# Patient Record
Sex: MALE | Race: WHITE
[De-identification: names, ages, dates, MRNs, and addresses within clinical notes are randomized per-mention and may not be internally consistent; named-entity substitution may affect disease eponyms.]

---

## 2017-01-20 ENCOUNTER — TELEPHONE (OUTPATIENT)
Dept: CASE MANAGEMENT | Age: 62
End: 2017-01-20

## 2021-07-23 ENCOUNTER — TELEPHONE (OUTPATIENT)
Dept: FAMILY MEDICINE CLINIC | Age: 66
End: 2021-07-23

## 2021-07-23 NOTE — TELEPHONE ENCOUNTER
----- Message from Gadiel Quiet sent at 2021  4:03 PM EDT -----  Subject: Appointment Request    Reason for Call: New Patient Request Appointment    QUESTIONS  Type of Appointment? New Patient/New to Provider  Reason for appointment request? Requested Provider unavailable - [place]  Additional Information for Provider? Pt's wife Oswaldo Gomes) saw Dr. Inocencio Hutchinson last week and she said he gave verbal approval for her  to   become a new pt. Savanna Vivas is retired and can come in any day of the week at   any time. Screened green. ---------------------------------------------------------------------------  --------------  Heber MCQUEEN  What is the best way for the office to contact you? OK to leave message on   voicemail  Preferred Call Back Phone Number? [phone]  ---------------------------------------------------------------------------  --------------  SCRIPT ANSWERS  Relationship to Patient? Other  Representative Name? Wife- Melissa Duckworth  Additional information verified (besides Name and Date of Birth)? Address  Appointment reason? Establish Care/Find a provider  Is this the first appointment to establish care for a ? No  Have you been diagnosed with, awaiting test results for, or told that you   are suspected of having COVID-19 (Coronavirus)? (If patient has tested   negative or was tested as a requirement for work, school, or travel and   not based on symptoms, answer no)? No  Do you currently have flu-like symptoms including fever or chills, cough,   shortness of breath, difficulty breathing, or new loss of taste or smell? No  Have you had close contact with someone with COVID-19 in the last 14 days? No  (Service Expert  click yes below to proceed with [employer] As Usual   Scheduling)?  Yes

## 2021-07-23 NOTE — TELEPHONE ENCOUNTER
Please advise    Additional Information for Provider? Pt's wife Raisa Batres) saw Sandra Randall last week and she said he gave verbal approval for her  to become a new pt. Roxane Flores is retired and can come in any day of the week at any time. Screened green.

## 2021-08-17 ENCOUNTER — OFFICE VISIT (OUTPATIENT)
Dept: FAMILY MEDICINE CLINIC | Age: 66
End: 2021-08-17
Payer: COMMERCIAL

## 2021-08-17 VITALS
DIASTOLIC BLOOD PRESSURE: 82 MMHG | OXYGEN SATURATION: 99 % | RESPIRATION RATE: 14 BRPM | TEMPERATURE: 99.3 F | HEIGHT: 68 IN | HEART RATE: 75 BPM | SYSTOLIC BLOOD PRESSURE: 134 MMHG | WEIGHT: 196.8 LBS | BODY MASS INDEX: 29.83 KG/M2

## 2021-08-17 DIAGNOSIS — J84.9 ILD (INTERSTITIAL LUNG DISEASE) (HCC): ICD-10-CM

## 2021-08-17 DIAGNOSIS — M33.90 DERMATOMYOSITIS (HCC): ICD-10-CM

## 2021-08-17 DIAGNOSIS — R03.0 ELEVATED BLOOD PRESSURE READING: ICD-10-CM

## 2021-08-17 DIAGNOSIS — Z00.00 ROUTINE GENERAL MEDICAL EXAMINATION AT A HEALTH CARE FACILITY: Primary | ICD-10-CM

## 2021-08-17 DIAGNOSIS — Z12.5 SCREENING FOR PROSTATE CANCER: ICD-10-CM

## 2021-08-17 DIAGNOSIS — G47.33 OSA (OBSTRUCTIVE SLEEP APNEA): ICD-10-CM

## 2021-08-17 DIAGNOSIS — R73.9 HYPERGLYCEMIA: ICD-10-CM

## 2021-08-17 PROCEDURE — 99386 PREV VISIT NEW AGE 40-64: CPT | Performed by: FAMILY MEDICINE

## 2021-08-17 RX ORDER — M-VIT,TX,IRON,MINS/CALC/FOLIC 27MG-0.4MG
1 TABLET ORAL DAILY
COMMUNITY

## 2021-08-17 SDOH — ECONOMIC STABILITY: FOOD INSECURITY: WITHIN THE PAST 12 MONTHS, THE FOOD YOU BOUGHT JUST DIDN'T LAST AND YOU DIDN'T HAVE MONEY TO GET MORE.: NEVER TRUE

## 2021-08-17 SDOH — ECONOMIC STABILITY: FOOD INSECURITY: WITHIN THE PAST 12 MONTHS, YOU WORRIED THAT YOUR FOOD WOULD RUN OUT BEFORE YOU GOT MONEY TO BUY MORE.: NEVER TRUE

## 2021-08-17 ASSESSMENT — PATIENT HEALTH QUESTIONNAIRE - PHQ9
SUM OF ALL RESPONSES TO PHQ QUESTIONS 1-9: 0
1. LITTLE INTEREST OR PLEASURE IN DOING THINGS: 0
SUM OF ALL RESPONSES TO PHQ QUESTIONS 1-9: 0
SUM OF ALL RESPONSES TO PHQ QUESTIONS 1-9: 0
2. FEELING DOWN, DEPRESSED OR HOPELESS: 0
SUM OF ALL RESPONSES TO PHQ9 QUESTIONS 1 & 2: 0

## 2021-08-17 ASSESSMENT — SOCIAL DETERMINANTS OF HEALTH (SDOH): HOW HARD IS IT FOR YOU TO PAY FOR THE VERY BASICS LIKE FOOD, HOUSING, MEDICAL CARE, AND HEATING?: NOT HARD AT ALL

## 2021-08-17 NOTE — PROGRESS NOTES
Here to establish care and get started with practice. Pt states he is retired at this time, used to work for [employer]. Pts biggest issues is related to dermatomyositis, working with dr. Saul Solis. Pt is currently on imuran and it has done well for them. Pt has not issues with restriction in joints, and does follow with rheum. Pt has hx of CT lung tests and PFT's    Pt denies any new issues or concerns. Pt denies any issues of chest pain, shortness of breath. Pt has not been as active with lifestyle, does stay busy but is not doing much in terms of formal exercise. Pt does tend to eat out a little bit more frequently but does well with portion control issues. Except as noted in the history of present illness as above, the review of systems is negative for the following:    General ROS: negative  Psychological ROS: negative  Allergy and Immunology ROS: negative  Hematological and Lymphatic ROS: negative  Respiratory ROS: no cough, shortness of breath, or wheezing  Cardiovascular ROS: no chest pain or dyspnea on exertion  Gastrointestinal ROS: no abdominal pain, change in bowel habits, or black or bloody stools  Genito-Urinary ROS: no dysuria, trouble voiding, or hematuria  Musculoskeletal ROS: negative  Dermatological ROS: negative      Past medical, surgical, and social history reviewed and updated. Medications and allergies reviewed and updated      /82   Pulse 75   Temp 99.3 °F (37.4 °C) (Temporal)   Resp 14   Ht 5' 7.5\" (1.715 m)   Wt 196 lb 12.8 oz (89.3 kg)   SpO2 99%   BMI 30.37 kg/m²   General appearance - healthy, alert, no distress  Skin - Skin color, texture, turgor normal. No rashes or lesions. No suspicious findings  Head - Normocephalic. No masses, lesions, tenderness or abnormalities  Eyes - conjunctivae/corneas clear. Pupils equal and reactive to light and accomodation, extraocular muscles intact. Ears - External ears normal. Canals clear.  Tympanic membranes normal bilaterally. Nose/Sinuses - Nares normal. Septum midline. Mucosa normal. No drainage or sinus tenderness. Oropharynx - Lips, mucosa, and tongue normal. Teeth and gums normal.   Neck - Neck supple. No adenopathy. Thyroid symmetric, normal size, no carotid bruit bilaterally  Back - Back symmetric, no curvature. Range of motion normal. No Costovertebral angle tenderness. Lungs - Percussion normal. Good diaphragmatic excursion. Lungs clear without wheeze, rales, crackles  Heart - Regular rate and rhythm, with no rub, murmur or gallop noted. Abdomen - Abdomen soft, non-tender. Bowel sounds normal. No masses, tenderness or organomegaly  Extremities - Extremities normal. No deformities, edema, or skin discoloration  Musculoskeletal - Spine ROM normal. Muscular strength intact. Peripheral pulses - radial=4/4,, femoral=4/4, popliteal=4/4, dorsalis pedis=4/4,  Neuro - Gait normal. Reflexes normal and symmetric. Sensation grossly normal.  No focal weakness  Psych - euthymic, no suicidal thoughts or ideation, mood stable. Current Outpatient Medications   Medication Sig Dispense Refill    Ferrous Sulfate (IRON PO) Take by mouth      Multiple Vitamins-Minerals (THERAPEUTIC MULTIVITAMIN-MINERALS) tablet Take 1 tablet by mouth daily      Naproxen Sodium (ALEVE PO) Take by mouth as needed      azaTHIOprine (IMURAN) 50 MG tablet Take 50 mg by mouth daily      aspirin 81 MG chewable tablet Take 81 mg by mouth daily       No current facility-administered medications for this visit. ASSESSMENT / PLAN:    1. Routine general medical examination at a health care facility  No focal abnormalities on exam  Anticipatory guidance discussed. - Comprehensive Metabolic Panel; Future  - Lipid Panel; Future  - Hemoglobin A1C; Future  - PSA, Prostatic Specific Antigen; Future    2. Dermatomyositis ([facility] [address]  Cont f/u with rheum  Cont imuran  Reviewed bloodwork through care everywhere    3. SHILPA (obstructive sleep apnea)    4.  ILD (interstitial lung disease) ([facility] [address]  Asymptomatic  Cont imuran and f/u with rheumatology    5. Hyperglycemia  - Hemoglobin A1C; Future    6. Screening for prostate cancer  - PSA, Prostatic Specific Antigen; Future    7. Elevated blood pressure reading  Recheck normal  monitor           Follow-up appointment:   Pending bloodwork results/prn    Discussed use, benefit, and side effects of all prescribed medications. Barriers to medication compliance addressed. All patient questions answered. Pt voiced understanding. When applicable, patient's outside records were reviewed through [facility]. The patient has signed appropriate paperworks/consents.

## 2022-11-08 ENCOUNTER — OFFICE VISIT (OUTPATIENT)
Dept: FAMILY MEDICINE CLINIC | Age: 67
End: 2022-11-08
Payer: COMMERCIAL

## 2022-11-08 VITALS
BODY MASS INDEX: 29.28 KG/M2 | RESPIRATION RATE: 14 BRPM | DIASTOLIC BLOOD PRESSURE: 78 MMHG | HEIGHT: 68 IN | SYSTOLIC BLOOD PRESSURE: 130 MMHG | OXYGEN SATURATION: 98 % | WEIGHT: 193.2 LBS | TEMPERATURE: 97.3 F | HEART RATE: 65 BPM

## 2022-11-08 DIAGNOSIS — R73.9 HYPERGLYCEMIA: ICD-10-CM

## 2022-11-08 DIAGNOSIS — J84.9 ILD (INTERSTITIAL LUNG DISEASE) (HCC): ICD-10-CM

## 2022-11-08 DIAGNOSIS — Z12.5 SCREENING FOR MALIGNANT NEOPLASM OF PROSTATE: ICD-10-CM

## 2022-11-08 DIAGNOSIS — M54.50 CHRONIC RIGHT-SIDED LOW BACK PAIN WITHOUT SCIATICA: ICD-10-CM

## 2022-11-08 DIAGNOSIS — Z00.00 ROUTINE GENERAL MEDICAL EXAMINATION AT A HEALTH CARE FACILITY: Primary | ICD-10-CM

## 2022-11-08 DIAGNOSIS — G89.29 CHRONIC RIGHT-SIDED LOW BACK PAIN WITHOUT SCIATICA: ICD-10-CM

## 2022-11-08 DIAGNOSIS — Z00.00 ROUTINE GENERAL MEDICAL EXAMINATION AT A HEALTH CARE FACILITY: ICD-10-CM

## 2022-11-08 DIAGNOSIS — Z23 NEEDS FLU SHOT: ICD-10-CM

## 2022-11-08 DIAGNOSIS — Z23 NEED FOR VACCINATION FOR PNEUMOCOCCUS: ICD-10-CM

## 2022-11-08 DIAGNOSIS — M33.90 DERMATOMYOSITIS (HCC): ICD-10-CM

## 2022-11-08 LAB
A/G RATIO: 2.3 (ref 1.1–2.2)
ALBUMIN SERPL-MCNC: 4.6 G/DL (ref 3.4–5)
ALP BLD-CCNC: 116 U/L (ref 40–129)
ALT SERPL-CCNC: 15 U/L (ref 10–40)
ANION GAP SERPL CALCULATED.3IONS-SCNC: 15 MMOL/L (ref 3–16)
AST SERPL-CCNC: 21 U/L (ref 15–37)
BILIRUB SERPL-MCNC: 0.5 MG/DL (ref 0–1)
BUN BLDV-MCNC: 20 MG/DL (ref 7–20)
CALCIUM SERPL-MCNC: 9.3 MG/DL (ref 8.3–10.6)
CHLORIDE BLD-SCNC: 101 MMOL/L (ref 99–110)
CHOLESTEROL, TOTAL: 176 MG/DL (ref 0–199)
CO2: 25 MMOL/L (ref 21–32)
CREAT SERPL-MCNC: 1.1 MG/DL (ref 0.8–1.3)
GFR SERPL CREATININE-BSD FRML MDRD: >60 ML/MIN/{1.73_M2}
GLUCOSE BLD-MCNC: 105 MG/DL (ref 70–99)
HCT VFR BLD CALC: 44.2 % (ref 40.5–52.5)
HDLC SERPL-MCNC: 41 MG/DL (ref 40–60)
HEMOGLOBIN: 15.1 G/DL (ref 13.5–17.5)
LDL CHOLESTEROL CALCULATED: 113 MG/DL
MCH RBC QN AUTO: 32.9 PG (ref 26–34)
MCHC RBC AUTO-ENTMCNC: 34.2 G/DL (ref 31–36)
MCV RBC AUTO: 96.1 FL (ref 80–100)
PDW BLD-RTO: 14.2 % (ref 12.4–15.4)
PLATELET # BLD: 267 K/UL (ref 135–450)
PMV BLD AUTO: 8.1 FL (ref 5–10.5)
POTASSIUM SERPL-SCNC: 4.3 MMOL/L (ref 3.5–5.1)
PROSTATE SPECIFIC ANTIGEN: 0.19 NG/ML (ref 0–4)
RBC # BLD: 4.6 M/UL (ref 4.2–5.9)
SODIUM BLD-SCNC: 141 MMOL/L (ref 136–145)
TOTAL PROTEIN: 6.6 G/DL (ref 6.4–8.2)
TRIGL SERPL-MCNC: 110 MG/DL (ref 0–150)
VLDLC SERPL CALC-MCNC: 22 MG/DL
WBC # BLD: 11.1 K/UL (ref 4–11)

## 2022-11-08 PROCEDURE — 90694 VACC AIIV4 NO PRSRV 0.5ML IM: CPT | Performed by: FAMILY MEDICINE

## 2022-11-08 PROCEDURE — 90677 PCV20 VACCINE IM: CPT | Performed by: FAMILY MEDICINE

## 2022-11-08 PROCEDURE — 90472 IMMUNIZATION ADMIN EACH ADD: CPT | Performed by: FAMILY MEDICINE

## 2022-11-08 PROCEDURE — 99397 PER PM REEVAL EST PAT 65+ YR: CPT | Performed by: FAMILY MEDICINE

## 2022-11-08 PROCEDURE — 90471 IMMUNIZATION ADMIN: CPT | Performed by: FAMILY MEDICINE

## 2022-11-08 SDOH — ECONOMIC STABILITY: FOOD INSECURITY: WITHIN THE PAST 12 MONTHS, YOU WORRIED THAT YOUR FOOD WOULD RUN OUT BEFORE YOU GOT MONEY TO BUY MORE.: NEVER TRUE

## 2022-11-08 SDOH — ECONOMIC STABILITY: TRANSPORTATION INSECURITY
IN THE PAST 12 MONTHS, HAS THE LACK OF TRANSPORTATION KEPT YOU FROM MEDICAL APPOINTMENTS OR FROM GETTING MEDICATIONS?: NO

## 2022-11-08 SDOH — ECONOMIC STABILITY: TRANSPORTATION INSECURITY
IN THE PAST 12 MONTHS, HAS LACK OF TRANSPORTATION KEPT YOU FROM MEETINGS, WORK, OR FROM GETTING THINGS NEEDED FOR DAILY LIVING?: NO

## 2022-11-08 SDOH — ECONOMIC STABILITY: FOOD INSECURITY: WITHIN THE PAST 12 MONTHS, THE FOOD YOU BOUGHT JUST DIDN'T LAST AND YOU DIDN'T HAVE MONEY TO GET MORE.: NEVER TRUE

## 2022-11-08 ASSESSMENT — PATIENT HEALTH QUESTIONNAIRE - PHQ9
SUM OF ALL RESPONSES TO PHQ QUESTIONS 1-9: 0
2. FEELING DOWN, DEPRESSED OR HOPELESS: 0
SUM OF ALL RESPONSES TO PHQ9 QUESTIONS 1 & 2: 0
SUM OF ALL RESPONSES TO PHQ QUESTIONS 1-9: 0
1. LITTLE INTEREST OR PLEASURE IN DOING THINGS: 0

## 2022-11-08 ASSESSMENT — SOCIAL DETERMINANTS OF HEALTH (SDOH): HOW HARD IS IT FOR YOU TO PAY FOR THE VERY BASICS LIKE FOOD, HOUSING, MEDICAL CARE, AND HEATING?: NOT HARD AT ALL

## 2022-11-08 NOTE — PROGRESS NOTES
Here for well checkup, physical.  Pt states he was retired, but is back working, for the past 6 months. Pt working part time at [employer] as an , cleaning up fiber management locally. Pt does enjoy being back at work. Pt does watch [employer], and works tues-Thursday. Never works on weekends    Pt has had some issues of chronic back pain issues. Pt states that sx are in lower back, and flares up intermittently. No sciatica issues and no weakness. Pt can push on area that is tender    Pt did see dermatology for routine f/u and did have bx done to L temple and scalp and will be set up for MOHS. Pt is working with  for his dematomyositis. Remains on imuran and things are doing well at this time. Pts muscle strength seems normal and no issues of numbness/tingling. Pt does not have a regimented exercise routine but does well to stay active. No issues of exertional chest pain, shortness of breath. No dizziness. Except as noted in the history of present illness as above, the review of systems is negative for the following:    General ROS: negative  Psychological ROS: negative  Allergy and Immunology ROS: negative  Hematological and Lymphatic ROS: negative  Respiratory ROS: no cough, shortness of breath, or wheezing  Cardiovascular ROS: no chest pain or dyspnea on exertion  Gastrointestinal ROS: no abdominal pain, change in bowel habits, or black or bloody stools  Genito-Urinary ROS: no dysuria, trouble voiding, or hematuria  Musculoskeletal ROS: negative  Dermatological ROS: negative      Past medical, surgical, and social history reviewed and updated.    Medications and allergies reviewed and updated        /78   Pulse 65   Temp 97.3 °F (36.3 °C) (Temporal)   Resp 14   Ht 5' 7.5\" (1.715 m)   Wt 193 lb 3.2 oz (87.6 kg)   SpO2 98%   BMI 29.81 kg/m²   General appearance - healthy, alert, no distress  Skin - Skin color, texture, turgor normal. No rashes or lesions. No suspicious findings  Head - Normocephalic. No masses, lesions, tenderness or abnormalities  Eyes - conjunctivae/corneas clear. Pupils equal and reactive to light and accomodation, extraocular muscles intact. Ears - External ears normal. Canals clear. Tympanic membranes normal bilaterally. Nose/Sinuses - Nares normal. Septum midline. Mucosa normal. No drainage or sinus tenderness. Oropharynx - Lips, mucosa, and tongue normal. Teeth and gums normal.   Neck - Neck supple. No adenopathy. Thyroid symmetric, normal size, no carotid bruit bilaterally  Back - Back symmetric, no curvature. Range of motion normal. No Costovertebral angle tenderness. Lungs - Percussion normal. Good diaphragmatic excursion. Lungs clear without wheeze, rales, crackles  Heart - Regular rate and rhythm, with no rub, murmur or gallop noted. Abdomen - Abdomen soft, non-tender. Bowel sounds normal. No masses, tenderness or organomegaly  Extremities - Extremities normal. No deformities, edema, or skin discoloration  Musculoskeletal - Spine ROM normal. Muscular strength intact. Peripheral pulses - radial=4/4,, femoral=4/4, popliteal=4/4, dorsalis pedis=4/4,  Neuro - Gait normal. Reflexes normal and symmetric. Sensation grossly normal.  No focal weakness  Psych - euthymic, no suicidal thoughts or ideation, mood stable. Current Outpatient Medications   Medication Sig Dispense Refill    vitamin D (CHOLECALCIFEROL) 25 MCG (1000 UT) TABS tablet Take 1 tablet by mouth daily      Multiple Vitamins-Minerals (THERAPEUTIC MULTIVITAMIN-MINERALS) tablet Take 1 tablet by mouth daily      Naproxen Sodium (ALEVE PO) Take by mouth as needed      azaTHIOprine (IMURAN) 50 MG tablet Take 50 mg by mouth daily      aspirin 81 MG chewable tablet Take 81 mg by mouth daily       No current facility-administered medications for this visit.        Immunization History   Administered Date(s) Administered    COVID-19, PFIZER GRAY top, DO NOT Dilute, (age 12 y+), IM, 30 mcg/0.3 mL 04/04/2022    COVID-19, PFIZER PURPLE top, DILUTE for use, (age 15 y+), 30mcg/0.3mL 03/02/2021, 03/31/2021, 11/06/2021    Influenza Virus Vaccine 11/06/2021    Influenza, FLUARIX, FLULAVAL, FLUZONE (age 10 mo+) AND AFLURIA, (age 1 y+), PF, 0.5mL 10/09/2018, 10/15/2020    Pneumococcal conjugate PCV20, PF (Prevnar 20) 11/08/2022    Tdap (Boostrix, Adacel) 11/16/2018    Zoster Recombinant (Shingrix) 11/21/2019, 02/03/2020          ASSESSMENT / PLAN:    1. Routine general medical examination at a health care facility  No focal abnormalities on exam  Anticipatory guidance discussed. Check bloodwork as below  - CBC; Future  - Comprehensive Metabolic Panel; Future  - Lipid Panel; Future  - Hemoglobin A1C; Future    2. Hyperglycemia  Mild increase in a1c at last visit  Check f/u bloodwork  Cont to monitor with attempt at weight reduction, exercise, sugar/carbohydrate modification  - Hemoglobin A1C; Future    3. Dermatomyositis ([address]  Cont imuran  F/u with rheumatology  Reviewed recent note  Reviewed recent CT    4. ILD (interstitial lung disease) (HCC)  Stable w/o sx  Cont serial imaging    5. Needs flu shot  Given HD flu today    6. Need for vaccination for pneumococcus  Given PCV20    7. Chronic right-sided low back pain without sciatica  Muscular  Exam normal  Monitor with range of motion exercises  Exercise handout given. Instructed on use, benefits. 8. Screening for malignant neoplasm of prostate  - PSA, Prostatic Specific Antigen; Future           Follow-up appointment:   Pending bloodwork  1 year  Prn     Discussed use, benefit, and side effects of all prescribed medications. Barriers to medication compliance addressed. All patient questions answered. Pt voiced understanding. When applicable, patient's outside records were reviewed through [facility]. The patient has signed appropriate paperworks/consents.

## 2022-11-09 LAB
ESTIMATED AVERAGE GLUCOSE: 119.8 MG/DL
HBA1C MFR BLD: 5.8 %

## 2023-02-09 ENCOUNTER — OFFICE VISIT (OUTPATIENT)
Dept: FAMILY MEDICINE CLINIC | Age: 68
End: 2023-02-09
Payer: COMMERCIAL

## 2023-02-09 VITALS
RESPIRATION RATE: 12 BRPM | SYSTOLIC BLOOD PRESSURE: 124 MMHG | TEMPERATURE: 97.8 F | DIASTOLIC BLOOD PRESSURE: 74 MMHG | WEIGHT: 198 LBS | BODY MASS INDEX: 30.55 KG/M2 | HEART RATE: 68 BPM | OXYGEN SATURATION: 96 %

## 2023-02-09 DIAGNOSIS — H25.13 AGE-RELATED NUCLEAR CATARACT OF BOTH EYES: ICD-10-CM

## 2023-02-09 DIAGNOSIS — M33.90 DERMATOMYOSITIS (HCC): ICD-10-CM

## 2023-02-09 DIAGNOSIS — Z01.810 PREOP CARDIOVASCULAR EXAM: Primary | ICD-10-CM

## 2023-02-09 DIAGNOSIS — J84.9 ILD (INTERSTITIAL LUNG DISEASE) (HCC): ICD-10-CM

## 2023-02-09 DIAGNOSIS — H43.392 VITREOUS FLOATERS OF LEFT EYE: ICD-10-CM

## 2023-02-09 PROCEDURE — 99214 OFFICE O/P EST MOD 30 MIN: CPT | Performed by: FAMILY MEDICINE

## 2023-02-09 PROCEDURE — 1123F ACP DISCUSS/DSCN MKR DOCD: CPT | Performed by: FAMILY MEDICINE

## 2023-02-09 SDOH — ECONOMIC STABILITY: INCOME INSECURITY: HOW HARD IS IT FOR YOU TO PAY FOR THE VERY BASICS LIKE FOOD, HOUSING, MEDICAL CARE, AND HEATING?: NOT HARD AT ALL

## 2023-02-09 SDOH — ECONOMIC STABILITY: HOUSING INSECURITY
IN THE LAST 12 MONTHS, WAS THERE A TIME WHEN YOU DID NOT HAVE A STEADY PLACE TO SLEEP OR SLEPT IN A SHELTER (INCLUDING NOW)?: NO

## 2023-02-09 SDOH — ECONOMIC STABILITY: FOOD INSECURITY: WITHIN THE PAST 12 MONTHS, YOU WORRIED THAT YOUR FOOD WOULD RUN OUT BEFORE YOU GOT MONEY TO BUY MORE.: NEVER TRUE

## 2023-02-09 SDOH — ECONOMIC STABILITY: FOOD INSECURITY: WITHIN THE PAST 12 MONTHS, THE FOOD YOU BOUGHT JUST DIDN'T LAST AND YOU DIDN'T HAVE MONEY TO GET MORE.: NEVER TRUE

## 2023-02-09 ASSESSMENT — PATIENT HEALTH QUESTIONNAIRE - PHQ9
SUM OF ALL RESPONSES TO PHQ QUESTIONS 1-9: 0
SUM OF ALL RESPONSES TO PHQ9 QUESTIONS 1 & 2: 0
1. LITTLE INTEREST OR PLEASURE IN DOING THINGS: 0
2. FEELING DOWN, DEPRESSED OR HOPELESS: 0

## 2023-02-09 NOTE — PROGRESS NOTES
Subjective:    Chief Complaint:     Shi Guy is a 74 y.o. male who presents for a preoperative physical examination. He is scheduled to have bilateral cataracts done by Dr. Carlos Pollack at [facility]. First one will be done 2/17, and second 3/3. Pt will have cataract bilaterallyand on L eye will work with dr. Shi Doe for some floaters. Pt is looking forward to getting done due to chronic eye issues. Pt did have MOHS surgery for BCC on L temple, worked with favian and is very happy with the recovery. Pt is more prone to skin CA based on     Pt states that things are stable with dermatomyositis. Pt continues on imuran with a recent decrease in dosage, and will have f/u bloodwork soon. Currently on 1 1/2 tabs a day     ILD is stable, doing well. Pt denies any issues of chest pain, shortness of breath. No wheezing      History of Present Illness:          Past Medical History:   Diagnosis Date    Dermatomyositis ([facility] [address]     Hx SBO     Hyperglycemia     ILD (interstitial lung disease) ([facility] [address] 08/17/2021    SHILPA (obstructive sleep apnea)         Review of patient's past surgical history indicates:     Past Surgical History:   Procedure Laterality Date    CHOLECYSTECTOMY, LAPAROSCOPIC  2019    EYE SURGERY      HERNIA REPAIR      VASCULAR SURGERY                                                     Current Outpatient Medications   Medication Sig Dispense Refill    vitamin D (CHOLECALCIFEROL) 25 MCG (1000 UT) TABS tablet Take 1 tablet by mouth daily      Multiple Vitamins-Minerals (THERAPEUTIC MULTIVITAMIN-MINERALS) tablet Take 1 tablet by mouth daily      Naproxen Sodium (ALEVE PO) Take by mouth as needed      azaTHIOprine (IMURAN) 50 MG tablet Take 75 mg by mouth daily      aspirin 81 MG chewable tablet Take 81 mg by mouth daily       No current facility-administered medications for this visit.        Allergies   Allergen Reactions    Lipitor [Atorvastatin]     Niacin And Related Other (See Comments)     Tingling, flush Social History     Tobacco Use    Smoking status: Never    Smokeless tobacco: Never   Substance Use Topics    Alcohol use: Yes     Comment: rarely/socially    Drug use: No        Family History   Problem Relation Age of Onset    Coronary Art Dis Father 62        Review Of Systems    Skin: no abnormal pigmentation, rash, scaling, itching, masses, hair or nail changes  Eyes: negative  Ears/Nose/Throat: negative  Respiratory: negative  Cardiovascular: negative  Gastrointestinal: negative  Genitourinary: negative  Musculoskeletal: negative  Neurologic: negative  Psychiatric: negative  Hematologic/Lymphatic/Immunologic: negative  Endocrine: negative       Objective:      /74   Pulse 68   Temp 97.8 °F (36.6 °C) (Temporal)   Resp 12   Wt 198 lb (89.8 kg)   SpO2 96%   BMI 30.55 kg/m²   General appearance - healthy, alert, no distress  Skin - Skin color, texture, turgor normal. No rashes or lesions. Head - Normocephalic. No masses, lesions, tenderness or abnormalities  Eyes - conjunctivae/corneas clear. PERRL, EOM's intact. Ears - External ears normal. Canals clear. TM's normal.  Nose/Sinuses - Nares normal. Septum midline. Mucosa normal. No drainage or sinus tenderness. Oropharynx - Lips, mucosa, and tongue normal. Teeth and gums normal.   Neck - Neck supple. No adenopathy. Thyroid symmetric, normal size,  Back - Back symmetric, no curvature. ROM normal. No CVA tenderness. Lungs - Percussion normal. Good diaphragmatic excursion. Lungs clear  Heart - Regular rate and rhythm, with no rub, murmur or gallop noted. Abdomen - Abdomen soft, non-tender. BS normal. No masses, organomegaly  Extremities - Extremities normal. No deformities, edema, or skin discoloration  Musculoskeletal - Spine ROM normal. Muscular strength intact. Peripheral pulses - radial=4/4,, femoral=4/4, popliteal=4/4, dorsalis pedis=4/4,  Neuro - Gait normal. Reflexes normal and symmetric.  Sensation grossly normal.  No focal weakness      ASSESSMENT / PLAN:    1. Preop cardiovascular exam  Medically cleared for surgery. 2. Vitreous floaters of left eye  Medically cleared for surgery. 3. Age-related nuclear cataract of both eyes  Progressive sx  Set for surgery  Medically cleared for surgery. 4. Dermatomyositis ([address]  Cont imuran, f/u with rheum  Med list updated  F/u with rheum in 2-3 weeks    5. ILD (interstitial lung disease) ([facility])  Stable w/o sx  Monitor for shortness of breath, wheezing           Follow-up appointment:   After surgery  Prn     Discussed use, benefit, and side effects of all prescribed medications. Barriers to medication compliance addressed. All patient questions answered. Pt voiced understanding. When applicable, patient's outside records were reviewed through [facility]. The patient has signed appropriate paperworks/consents. Per encounter diagnoses   He is medically cleared for surgery and anesthesia. Avoid Aspirin, non steroidal anti inflammatory medications, including Motrin, Aleve, Ibuprofen, Advil; multi vitamins, Vitamin E, omega 3 fish oil, and glucosamine chondroitin for the 7 days prior to surgery.

## 2023-04-17 ENCOUNTER — OFFICE VISIT (OUTPATIENT)
Dept: URGENT CARE | Age: 68
End: 2023-04-17

## 2023-04-17 VITALS
HEIGHT: 68 IN | BODY MASS INDEX: 30.01 KG/M2 | WEIGHT: 198 LBS | SYSTOLIC BLOOD PRESSURE: 141 MMHG | RESPIRATION RATE: 15 BRPM | OXYGEN SATURATION: 95 % | HEART RATE: 74 BPM | TEMPERATURE: 97.6 F | DIASTOLIC BLOOD PRESSURE: 90 MMHG

## 2023-04-17 DIAGNOSIS — H10.32 ACUTE BACTERIAL CONJUNCTIVITIS OF LEFT EYE: Primary | ICD-10-CM

## 2023-04-17 RX ORDER — ERYTHROMYCIN 5 MG/G
OINTMENT OPHTHALMIC
Qty: 1 G | Refills: 0 | Status: SHIPPED | OUTPATIENT
Start: 2023-04-17 | End: 2023-04-27

## 2023-04-17 ASSESSMENT — ENCOUNTER SYMPTOMS
EYE DISCHARGE: 1
PHOTOPHOBIA: 0
EYE PAIN: 1
EYE REDNESS: 1
EYE ITCHING: 0

## 2023-04-17 ASSESSMENT — VISUAL ACUITY: OU: 1

## 2023-04-17 NOTE — PROGRESS NOTES
Paolo Whitley (:  1955) is a 74 y.o. male,New patient, here for evaluation of the following chief complaint(s):  Eye Problem (Left eye irritation and redness with drainage began yesterday morning)      ASSESSMENT/PLAN:  1. Acute bacterial conjunctivitis of left eye  Vision Screening    Right eye Left eye Both eyes   Without correction 20/50 20/30 -1 20/25 -2   With correction      Patient reports right eye has decreased vision at baseline  Erythromycin eye ointment as directed  Call optometrist and notify of sxs    - erythromycin (ROMYCIN) 5 MG/GM ophthalmic ointment; Apply 1cm ribbon to left eye four times daily for one week  Dispense: 1 g; Refill: 0       Return if symptoms worsen or fail to improve. SUBJECTIVE/OBJECTIVE:  Patient comes in today for left eye redness and irritation that started yesterday morning. Drainage started by afternoon and has increased since. Patient has hx of bilateral cataract surgery and retinal repair in March, has f/u with surgeon tomorrow. Patient had exposure to pink eye from granddaughter. History provided by:  Patient   used: No    Eye Problem   Associated symptoms include an eye discharge and eye redness. Pertinent negatives include no itching or photophobia. Vitals:    23 0813 23 1014   BP: (!) 162/95 (!) 141/90   Site: Right Upper Arm    Position: Sitting    Pulse: 74    Resp: 15    Temp: 97.6 °F (36.4 °C)    SpO2: 95%    Weight: 198 lb (89.8 kg)    Height: 5' 7.5\" (1.715 m)        Review of Systems   Eyes:  Positive for pain, discharge and redness. Negative for photophobia, itching and visual disturbance. Physical Exam  Vitals reviewed. Constitutional:       Appearance: Normal appearance. HENT:      Head: Normocephalic and atraumatic. Eyes:      General: Lids are normal. Lids are everted, no foreign bodies appreciated. Vision grossly intact. Extraocular Movements: Extraocular movements intact.

## 2023-09-18 ENCOUNTER — OFFICE VISIT (OUTPATIENT)
Dept: FAMILY MEDICINE CLINIC | Age: 68
End: 2023-09-18
Payer: COMMERCIAL

## 2023-09-18 VITALS
BODY MASS INDEX: 29.87 KG/M2 | DIASTOLIC BLOOD PRESSURE: 78 MMHG | TEMPERATURE: 98.3 F | SYSTOLIC BLOOD PRESSURE: 132 MMHG | WEIGHT: 193.6 LBS | HEART RATE: 78 BPM | OXYGEN SATURATION: 100 % | RESPIRATION RATE: 16 BRPM

## 2023-09-18 DIAGNOSIS — R05.3 PERSISTENT COUGH: ICD-10-CM

## 2023-09-18 DIAGNOSIS — R03.0 ELEVATED BLOOD PRESSURE READING: ICD-10-CM

## 2023-09-18 DIAGNOSIS — J84.9 ILD (INTERSTITIAL LUNG DISEASE) (HCC): ICD-10-CM

## 2023-09-18 DIAGNOSIS — J06.9 ACUTE URI: Primary | ICD-10-CM

## 2023-09-18 DIAGNOSIS — M33.90 DERMATOMYOSITIS (HCC): ICD-10-CM

## 2023-09-18 PROCEDURE — 1123F ACP DISCUSS/DSCN MKR DOCD: CPT | Performed by: FAMILY MEDICINE

## 2023-09-18 PROCEDURE — 99214 OFFICE O/P EST MOD 30 MIN: CPT | Performed by: FAMILY MEDICINE

## 2023-09-18 RX ORDER — AZITHROMYCIN 250 MG/1
TABLET, FILM COATED ORAL
Qty: 6 TABLET | Refills: 0 | Status: SHIPPED | OUTPATIENT
Start: 2023-09-18

## 2023-09-18 RX ORDER — METHYLPREDNISOLONE 4 MG/1
TABLET ORAL
Qty: 21 TABLET | Refills: 0 | Status: SHIPPED | OUTPATIENT
Start: 2023-09-18

## 2023-10-13 ENCOUNTER — PATIENT MESSAGE (OUTPATIENT)
Dept: FAMILY MEDICINE CLINIC | Age: 68
End: 2023-10-13

## 2023-10-13 DIAGNOSIS — Z12.11 SCREEN FOR COLON CANCER: Primary | ICD-10-CM

## 2023-10-30 ENCOUNTER — PATIENT MESSAGE (OUTPATIENT)
Dept: FAMILY MEDICINE CLINIC | Age: 68
End: 2023-10-30

## 2023-10-30 DIAGNOSIS — Z00.00 ROUTINE GENERAL MEDICAL EXAMINATION AT A HEALTH CARE FACILITY: Primary | ICD-10-CM

## 2023-10-30 NOTE — TELEPHONE ENCOUNTER
From: Levi Phillips  To: Dr. Fernandez Memos: 10/30/2023 9:56 AM EDT  Subject: Edith Leon for upcoming Physical     God Morning . I was just checking to see if you wanted me to do any Bloodwork this week, prior to my Physical on November 6.  If so, let me know, and I can get it done in order to discuss it during the Physical.  Thank you  Seth Epps

## 2023-11-02 DIAGNOSIS — Z00.00 ROUTINE GENERAL MEDICAL EXAMINATION AT A HEALTH CARE FACILITY: ICD-10-CM

## 2023-11-02 LAB
DEPRECATED RDW RBC AUTO: 14.8 % (ref 12.4–15.4)
HCT VFR BLD AUTO: 45.8 % (ref 40.5–52.5)
HGB BLD-MCNC: 15 G/DL (ref 13.5–17.5)
MCH RBC QN AUTO: 32.1 PG (ref 26–34)
MCHC RBC AUTO-ENTMCNC: 32.8 G/DL (ref 31–36)
MCV RBC AUTO: 97.9 FL (ref 80–100)
PLATELET # BLD AUTO: 277 K/UL (ref 135–450)
PMV BLD AUTO: 8.2 FL (ref 5–10.5)
RBC # BLD AUTO: 4.68 M/UL (ref 4.2–5.9)
WBC # BLD AUTO: 7.8 K/UL (ref 4–11)

## 2023-11-03 LAB
ALBUMIN SERPL-MCNC: 4.2 G/DL (ref 3.4–5)
ALBUMIN/GLOB SERPL: 2 {RATIO} (ref 1.1–2.2)
ALP SERPL-CCNC: 117 U/L (ref 40–129)
ALT SERPL-CCNC: 13 U/L (ref 10–40)
ANION GAP SERPL CALCULATED.3IONS-SCNC: 10 MMOL/L (ref 3–16)
AST SERPL-CCNC: 20 U/L (ref 15–37)
BILIRUB SERPL-MCNC: 0.4 MG/DL (ref 0–1)
BUN SERPL-MCNC: 22 MG/DL (ref 7–20)
CALCIUM SERPL-MCNC: 9.2 MG/DL (ref 8.3–10.6)
CHLORIDE SERPL-SCNC: 103 MMOL/L (ref 99–110)
CHOLEST SERPL-MCNC: 189 MG/DL (ref 0–199)
CO2 SERPL-SCNC: 27 MMOL/L (ref 21–32)
CREAT SERPL-MCNC: 1.1 MG/DL (ref 0.8–1.3)
EST. AVERAGE GLUCOSE BLD GHB EST-MCNC: 116.9 MG/DL
GFR SERPLBLD CREATININE-BSD FMLA CKD-EPI: >60 ML/MIN/{1.73_M2}
GLUCOSE SERPL-MCNC: 103 MG/DL (ref 70–99)
HBA1C MFR BLD: 5.7 %
HDLC SERPL-MCNC: 45 MG/DL (ref 40–60)
LDLC SERPL CALC-MCNC: 120 MG/DL
POTASSIUM SERPL-SCNC: 4.8 MMOL/L (ref 3.5–5.1)
PROT SERPL-MCNC: 6.3 G/DL (ref 6.4–8.2)
PSA SERPL DL<=0.01 NG/ML-MCNC: 0.19 NG/ML (ref 0–4)
SODIUM SERPL-SCNC: 140 MMOL/L (ref 136–145)
TRIGL SERPL-MCNC: 122 MG/DL (ref 0–150)
VLDLC SERPL CALC-MCNC: 24 MG/DL

## 2023-11-06 ENCOUNTER — OFFICE VISIT (OUTPATIENT)
Dept: FAMILY MEDICINE CLINIC | Age: 68
End: 2023-11-06
Payer: COMMERCIAL

## 2023-11-06 VITALS
HEART RATE: 82 BPM | SYSTOLIC BLOOD PRESSURE: 132 MMHG | BODY MASS INDEX: 30.68 KG/M2 | TEMPERATURE: 98.2 F | RESPIRATION RATE: 18 BRPM | WEIGHT: 198.8 LBS | DIASTOLIC BLOOD PRESSURE: 70 MMHG | OXYGEN SATURATION: 96 %

## 2023-11-06 DIAGNOSIS — Z12.11 SCREEN FOR COLON CANCER: ICD-10-CM

## 2023-11-06 DIAGNOSIS — M33.90 DERMATOMYOSITIS (HCC): ICD-10-CM

## 2023-11-06 DIAGNOSIS — Z00.00 ROUTINE GENERAL MEDICAL EXAMINATION AT A HEALTH CARE FACILITY: Primary | ICD-10-CM

## 2023-11-06 DIAGNOSIS — R73.9 HYPERGLYCEMIA: ICD-10-CM

## 2023-11-06 DIAGNOSIS — J84.9 ILD (INTERSTITIAL LUNG DISEASE) (HCC): ICD-10-CM

## 2023-11-06 DIAGNOSIS — R03.0 ELEVATED BLOOD PRESSURE READING: ICD-10-CM

## 2023-11-06 DIAGNOSIS — Z23 NEEDS FLU SHOT: ICD-10-CM

## 2023-11-06 PROCEDURE — 99397 PER PM REEVAL EST PAT 65+ YR: CPT | Performed by: FAMILY MEDICINE

## 2023-11-06 PROCEDURE — 90694 VACC AIIV4 NO PRSRV 0.5ML IM: CPT | Performed by: FAMILY MEDICINE

## 2023-11-06 PROCEDURE — 90471 IMMUNIZATION ADMIN: CPT | Performed by: FAMILY MEDICINE

## 2024-07-15 ENCOUNTER — OFFICE VISIT (OUTPATIENT)
Dept: FAMILY MEDICINE CLINIC | Age: 69
End: 2024-07-15
Payer: COMMERCIAL

## 2024-07-15 VITALS
TEMPERATURE: 97.3 F | SYSTOLIC BLOOD PRESSURE: 122 MMHG | WEIGHT: 206.2 LBS | DIASTOLIC BLOOD PRESSURE: 84 MMHG | BODY MASS INDEX: 31.25 KG/M2 | HEIGHT: 68 IN | HEART RATE: 81 BPM | OXYGEN SATURATION: 99 %

## 2024-07-15 DIAGNOSIS — J84.9 ILD (INTERSTITIAL LUNG DISEASE) (HCC): Primary | ICD-10-CM

## 2024-07-15 DIAGNOSIS — R09.89 CHEST CONGESTION: ICD-10-CM

## 2024-07-15 DIAGNOSIS — M33.13 DERMATOMYOSITIS (HCC): ICD-10-CM

## 2024-07-15 DIAGNOSIS — R05.3 CHRONIC COUGH: ICD-10-CM

## 2024-07-15 PROCEDURE — 1123F ACP DISCUSS/DSCN MKR DOCD: CPT | Performed by: FAMILY MEDICINE

## 2024-07-15 PROCEDURE — G2211 COMPLEX E/M VISIT ADD ON: HCPCS | Performed by: FAMILY MEDICINE

## 2024-07-15 PROCEDURE — 99214 OFFICE O/P EST MOD 30 MIN: CPT | Performed by: FAMILY MEDICINE

## 2024-07-15 RX ORDER — DOXYCYCLINE HYCLATE 100 MG
100 TABLET ORAL 2 TIMES DAILY
Qty: 20 TABLET | Refills: 0 | Status: SHIPPED | OUTPATIENT
Start: 2024-07-15 | End: 2024-07-25

## 2024-07-15 RX ORDER — METHYLPREDNISOLONE 4 MG/1
TABLET ORAL
Qty: 21 TABLET | Refills: 0 | Status: SHIPPED | OUTPATIENT
Start: 2024-07-15

## 2024-07-15 SDOH — ECONOMIC STABILITY: FOOD INSECURITY: WITHIN THE PAST 12 MONTHS, YOU WORRIED THAT YOUR FOOD WOULD RUN OUT BEFORE YOU GOT MONEY TO BUY MORE.: NEVER TRUE

## 2024-07-15 SDOH — ECONOMIC STABILITY: INCOME INSECURITY: HOW HARD IS IT FOR YOU TO PAY FOR THE VERY BASICS LIKE FOOD, HOUSING, MEDICAL CARE, AND HEATING?: NOT HARD AT ALL

## 2024-07-15 SDOH — ECONOMIC STABILITY: FOOD INSECURITY: WITHIN THE PAST 12 MONTHS, THE FOOD YOU BOUGHT JUST DIDN'T LAST AND YOU DIDN'T HAVE MONEY TO GET MORE.: NEVER TRUE

## 2024-07-15 ASSESSMENT — PATIENT HEALTH QUESTIONNAIRE - PHQ9
SUM OF ALL RESPONSES TO PHQ QUESTIONS 1-9: 0
1. LITTLE INTEREST OR PLEASURE IN DOING THINGS: NOT AT ALL
SUM OF ALL RESPONSES TO PHQ QUESTIONS 1-9: 0
2. FEELING DOWN, DEPRESSED OR HOPELESS: NOT AT ALL
SUM OF ALL RESPONSES TO PHQ9 QUESTIONS 1 & 2: 0

## 2024-07-15 NOTE — PROGRESS NOTES
H for eval of URI sx, with sx present for about 3-4 weeks.  Sx started in the chest and has lingered for about 4 weeks.  Cough is mild to moderate and at times is productive, with clear sputum.  No hemoptysis.  No issues of shortness of breath except for this AM.  Pt still working out.  Never had fever, chills.  No sick contacts.  Not taking anything for this.      Pt seeing rheum for dermatomyositis, and numbers have been doing better and they are in the process of getting down on dosing of imuran, now at 50mg and will cut to 1/2 tab QD in September    Pt does not see pulmonary, does have history ILD and does get serial imaging, last scan 11/2023 which was stable.  Does not endorse any chronic issues of shortness of breath or wheezing.        Except as noted above in the history of present illness, the review of systems is  negative for headache, vision changes, chest pain, shortness of breath, abdominal pain, urinary sx, bowel changes.    Past medical, surgical, and social history reviewed and updated  Medications and allergies reviewed and updated      O: /84   Pulse 81   Temp 97.3 °F (36.3 °C)   Ht 1.715 m (5' 7.5\")   Wt 93.5 kg (206 lb 3.2 oz)   SpO2 99%   BMI 31.82 kg/m²   GEN: No acute distress, cooperative, well nourished, alert.   HEENT: PEERLA, EOMI , normocephalic/atraumatic, nares and oropharynx clear.  Mucous membranes normal, Tympanic membranes clear bilaterally.  Neck: soft, supple, no thyromegaly, mass, no Lymphadenopathy  CV: Regular rate and rhythm, no murmur, rubs, gallops. No edema.  Resp: Clear to auscultation bilaterally good air entry bilaterally  bibasilar crackles w/o wheeze  no E-A changes  Psych: mood stable, No suicidal thoughts or ideation         Current Outpatient Medications   Medication Sig Dispense Refill    doxycycline hyclate (VIBRA-TABS) 100 MG tablet Take 1 tablet by mouth 2 times daily for 10 days 20 tablet 0    methylPREDNISolone (MEDROL DOSEPACK) 4 MG tablet Take

## 2024-07-18 ENCOUNTER — HOSPITAL ENCOUNTER (OUTPATIENT)
Age: 69
Discharge: HOME | End: 2024-07-18
Payer: COMMERCIAL

## 2024-07-18 DIAGNOSIS — J84.9 ILD (INTERSTITIAL LUNG DISEASE) (HCC): ICD-10-CM

## 2024-07-18 PROCEDURE — 71250 CT THORAX DX C-: CPT

## 2024-07-19 ENCOUNTER — PATIENT MESSAGE (OUTPATIENT)
Dept: FAMILY MEDICINE CLINIC | Age: 69
End: 2024-07-19

## 2024-07-19 DIAGNOSIS — J84.9 ILD (INTERSTITIAL LUNG DISEASE) (HCC): Primary | ICD-10-CM

## 2024-07-24 ENCOUNTER — TELEPHONE (OUTPATIENT)
Dept: FAMILY MEDICINE CLINIC | Age: 69
End: 2024-07-24

## 2024-07-24 NOTE — TELEPHONE ENCOUNTER
Pt asking for Dr Foreman to look at his [employer] message regarding Influenza A so he can see what the next course of action should be

## 2024-07-31 ENCOUNTER — OFFICE VISIT (OUTPATIENT)
Age: 69
End: 2024-07-31
Payer: COMMERCIAL

## 2024-07-31 VITALS
DIASTOLIC BLOOD PRESSURE: 82 MMHG | OXYGEN SATURATION: 92 % | BODY MASS INDEX: 30.01 KG/M2 | HEART RATE: 110 BPM | WEIGHT: 198 LBS | SYSTOLIC BLOOD PRESSURE: 128 MMHG | HEIGHT: 68 IN

## 2024-07-31 DIAGNOSIS — J84.9 ILD (INTERSTITIAL LUNG DISEASE) (HCC): Primary | ICD-10-CM

## 2024-07-31 DIAGNOSIS — R05.3 CHRONIC COUGH: ICD-10-CM

## 2024-07-31 DIAGNOSIS — D84.9 IMMUNOCOMPROMISED (HCC): ICD-10-CM

## 2024-07-31 PROCEDURE — 99204 OFFICE O/P NEW MOD 45 MIN: CPT | Performed by: INTERNAL MEDICINE

## 2024-07-31 PROCEDURE — 1123F ACP DISCUSS/DSCN MKR DOCD: CPT | Performed by: INTERNAL MEDICINE

## 2024-07-31 NOTE — PROGRESS NOTES
[facility]/[place] PULMONARY AND CRITICAL CARE    OUTPATIENT INITIAL NOTE    SUBJECTIVE:   Referring Physician: Luiz Foreman MD    CHIEF COMPLAINT / HPI:    Lucas is a 69 y.o. male that initially presented to clinic for evaluation of chronic cough  Has been dealing with it for 6 weeks. CT obtained by PCP showed chronic fibrotic changes.   Has history of dermatomyositis since 2011 for which he follows with Rheumatology, currently on Imuran.   Reported history of pulmonary histoplasmosis.   Also saw pulmonology in 2011 and got bronchoscopy.   States he has been dealing with Flu recently (1-2 week prior to our visit).   Some reported seasonal Allergies, mostly during the spring.   No GERD history.   Works out [employer] for 1 hour daily.   Relevant Social History  Tobacco: Never smoker  Substances: None reported.   Occupational: Retired/Part time  at [employer].   Pets: Dog x2  Hobbies: Woodworking, stains glass.   Family history of pulmonary problems: Sister with chronic lung disease requiring O2 supplementation.     Past Medical History:    Past Medical History:   Diagnosis Date    Dermatomyositis ([facility])     Hx SBO     Hyperglycemia     ILD (interstitial lung disease) ([facility]) 08/17/2021    SHILPA (obstructive sleep apnea)        Social History:    Social History     Tobacco Use   Smoking Status Never   Smokeless Tobacco Never       Family History:  Family History   Problem Relation Age of Onset    Coronary Art Dis Father 58    Heart Disease Father      Current Medications:  Current Outpatient Medications on File Prior to Visit   Medication Sig Dispense Refill    vitamin D (CHOLECALCIFEROL) 25 MCG (1000 UT) TABS tablet Take 1 tablet by mouth daily      Multiple Vitamins-Minerals (THERAPEUTIC MULTIVITAMIN-MINERALS) tablet Take 1 tablet by mouth daily      azaTHIOprine (IMURAN) 50 MG tablet Take 1.5 tablets by mouth daily      aspirin 81 MG chewable tablet Take 1 tablet by mouth daily       No current

## 2024-07-31 NOTE — ASSESSMENT & PLAN NOTE
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  No significant signs or symptoms to suggest he has an ongoing infection at the moment.  
Patient has history of dermatomyositis which is apparently controlled on Imuran.  He follows with rheumatology at [facility].  He reported that he gets CT scans every 2 years that have been relatively stable, also mentioned he does get PFTs every couple years as well however I am not able to see needed the report nor the numbers.  -Instructed to provide me with copies of the most recent PFTs he has done at [facility].  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  -Encouraged to continue physical activity, his fairly active and exercises 3 times a week for an hour with a .  -Discussed therapeutic possibilities for pulmonary fibrosis, at the moment I believe that he would benefit from them based on his current symptoms however if I see progressive decline on his PFTs numbers or relative progression of fibrosis on imaging we will readdress this in our next visit.  -Repeat PFT scheduled for November  
Reported has been dealing with this for about 6 weeks, unclear initiating factors, he did mention that he had the flu 2 weeks ago which might have prolonged on initial postviral cough, during my interview today his cough was minimal and he did report his overall symptoms have been slowly improving.  -Continue monitoring for now.  
[Negative] : Heme/Lymph

## 2024-11-06 ENCOUNTER — HOSPITAL ENCOUNTER (OUTPATIENT)
Dept: PULMONOLOGY | Age: 69
Discharge: HOME | End: 2024-11-06
Attending: INTERNAL MEDICINE
Payer: COMMERCIAL

## 2024-11-06 DIAGNOSIS — J84.9 ILD (INTERSTITIAL LUNG DISEASE) (HCC): ICD-10-CM

## 2024-11-06 PROCEDURE — 94060 EVALUATION OF WHEEZING: CPT

## 2024-11-06 PROCEDURE — 94729 DIFFUSING CAPACITY: CPT

## 2024-11-06 PROCEDURE — 94760 N-INVAS EAR/PLS OXIMETRY 1: CPT

## 2024-11-06 PROCEDURE — 94726 PLETHYSMOGRAPHY LUNG VOLUMES: CPT

## 2024-11-06 PROCEDURE — 94664 DEMO&/EVAL PT USE INHALER: CPT

## 2024-11-06 PROCEDURE — 6360000002 HC RX W HCPCS: Performed by: INTERNAL MEDICINE

## 2024-11-06 RX ORDER — ALBUTEROL SULFATE 0.83 MG/ML
2.5 SOLUTION RESPIRATORY (INHALATION) ONCE
Status: COMPLETED | OUTPATIENT
Start: 2024-11-06 | End: 2024-11-06

## 2024-11-06 RX ADMIN — ALBUTEROL SULFATE 2.5 MG: 2.5 SOLUTION RESPIRATORY (INHALATION) at 10:20

## 2024-11-07 NOTE — PROCEDURES
[address] [address]                           PULMONARY FUNCTION      PATIENT NAME: HERRERA RENDON               : 1955  MED REC NO: [id number]                      ROOM:   ACCOUNT NO: [id number]                       ADMIT DATE: 2024  PROVIDER: Juliet Vazquez MD      DATE OF PROCEDURE: 2024    SURGEON:  Juliet Vazquez MD    REFERRING PHYSICIAN:  MANOHAR QUINN    Forced vital capacity and FEV1 normal, FEV1 to FVC ratio mildly reduced.  After inhaled bronchodilator, mid to late expiratory flows improved.  Lung volume determinations by plethysmography show normal total lung capacity, residual volume, FRC.  Single breath diffusion capacity mildly reduced, but normal when compared to alveolar volume.    IMPRESSION:  Mild obstructive ventilatory defect.  Improvement seen in small airway flow rates after inhaled bronchodilator.  Findings consistent with mild chronic obstructive airflow disease with an element of bronchial reactivity.          JULIET VAZQUEZ MD      D:  2024 18:17:59     T:  2024 16:17:39     PANFILO/SURESH  Job #:  [id number]     Doc#:  [id number]

## 2024-11-11 ENCOUNTER — OFFICE VISIT (OUTPATIENT)
Age: 69
End: 2024-11-11
Payer: COMMERCIAL

## 2024-11-11 VITALS
BODY MASS INDEX: 29.7 KG/M2 | WEIGHT: 196 LBS | OXYGEN SATURATION: 94 % | HEART RATE: 83 BPM | HEIGHT: 68 IN | DIASTOLIC BLOOD PRESSURE: 74 MMHG | SYSTOLIC BLOOD PRESSURE: 128 MMHG

## 2024-11-11 DIAGNOSIS — J44.9 OBSTRUCTIVE LUNG DISEASE (HCC): ICD-10-CM

## 2024-11-11 DIAGNOSIS — D84.9 IMMUNOCOMPROMISED (HCC): ICD-10-CM

## 2024-11-11 DIAGNOSIS — J84.9 ILD (INTERSTITIAL LUNG DISEASE) (HCC): Primary | ICD-10-CM

## 2024-11-11 PROCEDURE — 99214 OFFICE O/P EST MOD 30 MIN: CPT | Performed by: INTERNAL MEDICINE

## 2024-11-11 PROCEDURE — 1123F ACP DISCUSS/DSCN MKR DOCD: CPT | Performed by: INTERNAL MEDICINE

## 2024-11-11 RX ORDER — ALBUTEROL SULFATE 90 UG/1
2 INHALANT RESPIRATORY (INHALATION) 4 TIMES DAILY PRN
Qty: 18 G | Refills: 5 | Status: SHIPPED | OUTPATIENT
Start: 2024-11-11

## 2024-11-11 NOTE — ASSESSMENT & PLAN NOTE
Patient has history of dermatomyositis which is apparently controlled on Imuran.  He follows with rheumatology at [facility]. He reported that he gets CT scans every 2 years that have been relatively stable, also mentioned he does get PFTs every couple years as well however I am not able to see needed the report nor the numbers.  -Encouraged to continue physical activity, his fairly active and exercises 3 times a week for an hour with a .  -Repeat PFT in 6 months  -There has been deterioration on PFTs since 2021, there was transient improvement noted in 2023 but it is unclear why this happened, I wonder whether this was at that time his Imuran dose was increased.  I will touch base with rheumatology regarding this issue since there is a possibility that his overall symptoms are controlled but there is pulmonary progression with Imuran dose decrease.

## 2024-11-11 NOTE — PROGRESS NOTES
[facility]/[place] PULMONARY AND CRITICAL CARE    OUTPATIENT INITIAL NOTE    SUBJECTIVE:   Referring Physician: Luiz Foreman MD    CHIEF COMPLAINT / HPI:    Lucas is a 69 y.o. male that initially presented to clinic for evaluation of chronic cough  Has been dealing with it for 6 weeks. CT obtained by PCP showed chronic fibrotic changes.   Has history of dermatomyositis since 2011 for which he follows with Rheumatology, currently on Imuran.   Reported history of pulmonary histoplasmosis.   Also saw pulmonology in 2011 and got bronchoscopy.   States he has been dealing with Flu recently (1-2 week prior to our visit).   Some reported seasonal Allergies, mostly during the spring.   No GERD history.   Works out [employer] for 1 hour daily.   Relevant Social History  Tobacco: Never smoker  Substances: None reported.   Occupational: Retired/Part time  at [employer].   Pets: Dog x2  Hobbies: Woodworking, stains glass.   Family history of pulmonary problems: Sister with chronic lung disease requiring O2 supplementation.     INTERVAL HISTORY:  11/11 - Doing well from the CTD perspective, mentioned his Imuran dose has been decreasing. Brought me his previous PFTs, noted improvement from 2021 to 2023 possibly related to Imuran dose adjustment.    Past Medical History:    Past Medical History:   Diagnosis Date    Dermatomyositis ([facility])     Hx SBO     Hyperglycemia     ILD (interstitial lung disease) ([facility]) 08/17/2021    SHILPA (obstructive sleep apnea)      Social History:    Social History     Tobacco Use   Smoking Status Never    Passive exposure: Never   Smokeless Tobacco Never       Family History:  Family History   Problem Relation Age of Onset    Coronary Art Dis Father 58    Heart Disease Father      Current Medications:  Current Outpatient Medications on File Prior to Visit   Medication Sig Dispense Refill    vitamin D (CHOLECALCIFEROL) 25 MCG (1000 UT) TABS tablet Take 1 tablet by mouth daily      Multiple

## 2024-11-11 NOTE — ASSESSMENT & PLAN NOTE
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  No significant signs or symptoms to suggest he has an ongoing infection at the moment.  -Continue treatment under Rheumatology guidance  -Will touch base to see if increased dose is needed.

## 2024-11-11 NOTE — ASSESSMENT & PLAN NOTE
Obstruction noted on PFTs, patient lacks exposures and has no history of asthma. Unclear etiology.   -Started SVITLANA PRN

## 2024-11-12 ENCOUNTER — OFFICE VISIT (OUTPATIENT)
Dept: FAMILY MEDICINE CLINIC | Age: 69
End: 2024-11-12

## 2024-11-12 VITALS
WEIGHT: 199.8 LBS | BODY MASS INDEX: 30.28 KG/M2 | SYSTOLIC BLOOD PRESSURE: 134 MMHG | HEIGHT: 68 IN | HEART RATE: 76 BPM | OXYGEN SATURATION: 100 % | DIASTOLIC BLOOD PRESSURE: 66 MMHG | TEMPERATURE: 98.3 F | RESPIRATION RATE: 16 BRPM

## 2024-11-12 DIAGNOSIS — J84.9 ILD (INTERSTITIAL LUNG DISEASE) (HCC): ICD-10-CM

## 2024-11-12 DIAGNOSIS — M33.13 DERMATOMYOSITIS (HCC): ICD-10-CM

## 2024-11-12 DIAGNOSIS — Z12.5 SCREENING FOR PROSTATE CANCER: ICD-10-CM

## 2024-11-12 DIAGNOSIS — R73.9 HYPERGLYCEMIA: ICD-10-CM

## 2024-11-12 DIAGNOSIS — G47.33 OSA (OBSTRUCTIVE SLEEP APNEA): ICD-10-CM

## 2024-11-12 DIAGNOSIS — J44.9 OBSTRUCTIVE LUNG DISEASE (HCC): ICD-10-CM

## 2024-11-12 DIAGNOSIS — R03.0 ELEVATED BLOOD PRESSURE READING: ICD-10-CM

## 2024-11-12 DIAGNOSIS — D84.9 IMMUNOCOMPROMISED (HCC): ICD-10-CM

## 2024-11-12 DIAGNOSIS — Z23 NEEDS FLU SHOT: Primary | ICD-10-CM

## 2024-11-12 DIAGNOSIS — M51.360 DEGENERATION OF INTERVERTEBRAL DISC OF LUMBAR REGION WITH DISCOGENIC BACK PAIN: ICD-10-CM

## 2024-11-12 DIAGNOSIS — Z00.00 ROUTINE GENERAL MEDICAL EXAMINATION AT A HEALTH CARE FACILITY: ICD-10-CM

## 2024-11-12 LAB
ALBUMIN SERPL-MCNC: 4.4 G/DL (ref 3.4–5)
ALBUMIN/GLOB SERPL: 1.8 {RATIO} (ref 1.1–2.2)
ALP SERPL-CCNC: 91 U/L (ref 40–129)
ALT SERPL-CCNC: 29 U/L (ref 10–40)
ANION GAP SERPL CALCULATED.3IONS-SCNC: 10 MMOL/L (ref 3–16)
AST SERPL-CCNC: 27 U/L (ref 15–37)
BASOPHILS # BLD: 0.1 K/UL (ref 0–0.2)
BASOPHILS NFR BLD: 1.2 %
BILIRUB SERPL-MCNC: 0.4 MG/DL (ref 0–1)
BUN SERPL-MCNC: 18 MG/DL (ref 7–20)
CALCIUM SERPL-MCNC: 9.8 MG/DL (ref 8.3–10.6)
CHLORIDE SERPL-SCNC: 105 MMOL/L (ref 99–110)
CHOLEST SERPL-MCNC: 210 MG/DL (ref 0–199)
CK SERPL-CCNC: 92 U/L (ref 39–308)
CO2 SERPL-SCNC: 27 MMOL/L (ref 21–32)
CREAT SERPL-MCNC: 1.1 MG/DL (ref 0.8–1.3)
DEPRECATED RDW RBC AUTO: 13.9 % (ref 12.4–15.4)
EOSINOPHIL # BLD: 0.1 K/UL (ref 0–0.6)
EOSINOPHIL NFR BLD: 0.9 %
GFR SERPLBLD CREATININE-BSD FMLA CKD-EPI: 73 ML/MIN/{1.73_M2}
GLUCOSE SERPL-MCNC: 98 MG/DL (ref 70–99)
HCT VFR BLD AUTO: 47.2 % (ref 40.5–52.5)
HDLC SERPL-MCNC: 40 MG/DL (ref 40–60)
HGB BLD-MCNC: 16 G/DL (ref 13.5–17.5)
LDLC SERPL CALC-MCNC: 136 MG/DL
LYMPHOCYTES # BLD: 1.4 K/UL (ref 1–5.1)
LYMPHOCYTES NFR BLD: 12.9 %
MCH RBC QN AUTO: 32.3 PG (ref 26–34)
MCHC RBC AUTO-ENTMCNC: 34 G/DL (ref 31–36)
MCV RBC AUTO: 95.3 FL (ref 80–100)
MONOCYTES # BLD: 1.1 K/UL (ref 0–1.3)
MONOCYTES NFR BLD: 9.9 %
NEUTROPHILS # BLD: 8.2 K/UL (ref 1.7–7.7)
NEUTROPHILS NFR BLD: 75.1 %
PLATELET # BLD AUTO: 301 K/UL (ref 135–450)
PMV BLD AUTO: 8.1 FL (ref 5–10.5)
POTASSIUM SERPL-SCNC: 5.3 MMOL/L (ref 3.5–5.1)
PROT SERPL-MCNC: 6.8 G/DL (ref 6.4–8.2)
PSA SERPL DL<=0.01 NG/ML-MCNC: 0.2 NG/ML (ref 0–4)
RBC # BLD AUTO: 4.95 M/UL (ref 4.2–5.9)
SODIUM SERPL-SCNC: 142 MMOL/L (ref 136–145)
TRIGL SERPL-MCNC: 172 MG/DL (ref 0–150)
TSH SERPL DL<=0.005 MIU/L-ACNC: 4.97 UIU/ML (ref 0.27–4.2)
VLDLC SERPL CALC-MCNC: 34 MG/DL
WBC # BLD AUTO: 10.9 K/UL (ref 4–11)

## 2024-11-12 NOTE — PROGRESS NOTES
Here for cpe, physical    Pt overall doing well, pt continues to follow with rheumatology and pulmonology for his dermatomyositis and ILD.  Pt remains on imuran and gets serial CT scans and serial PFts'.  Now working with dr. Nguyen across the street for pulmonary.  Last CT chest was done 7/2024 and just had pulmonary function tests done last week.  Dr. Kimball did call him last night that based on his findings on PFT's and concern of some progression of ILD, they decided to go back up to 100mg.  Pt does tolerate ok.  Pt will repeat PFT's in a few months.  No issues of breathing concerns    Pt is able to stay consistent with exerciwe, working out 3d/wk with cardio and weights and does feel that it has helped.  Pt has gained a little big of weight.  Pt feels diet is doing well.  Pt does not drink at all. Pt has started to do some clean eats meal prep and seems to be doing well for them, better than eating out.      Pt has been working with ortho / chiropractor for some chronic low back pain.  Xray show degenerative disc disease.  No issues of sciatica or weakness, but feels more muscle spasm. No numbness/tingling.      Pt feels that mood is doing well, still working part time 3d/wk and does enjoy that and watches grandson who is 7 months old.  Also had 3 year old and 6 year old grandchildren on Friday.  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        Except as noted in the history of present illness as above, the review of systems is negative for the following:    General ROS: negative  Psychological ROS: negative  Allergy and Immunology ROS: negative  Hematological and Lymphatic ROS: negative  Respiratory ROS: no cough, shortness of breath, or wheezing  Cardiovascular ROS: no chest pain or dyspnea on exertion  Gastrointestinal ROS: no abdominal pain, change in bowel habits, or black or bloody stools  Genito-Urinary ROS: no dysuria, trouble voiding, or hematuria  Musculoskeletal

## 2024-11-13 DIAGNOSIS — R79.89 ELEVATED TSH: ICD-10-CM

## 2024-11-13 DIAGNOSIS — R79.89 ELEVATED TSH: Primary | ICD-10-CM

## 2024-11-13 LAB
EST. AVERAGE GLUCOSE BLD GHB EST-MCNC: 122.6 MG/DL
HBA1C MFR BLD: 5.9 %
T4 FREE SERPL-MCNC: 1.1 NG/DL (ref 0.9–1.8)

## 2024-11-15 ENCOUNTER — PATIENT MESSAGE (OUTPATIENT)
Dept: FAMILY MEDICINE CLINIC | Age: 69
End: 2024-11-15

## 2024-11-19 ENCOUNTER — PATIENT MESSAGE (OUTPATIENT)
Dept: FAMILY MEDICINE CLINIC | Age: 69
End: 2024-11-19

## 2024-11-19 DIAGNOSIS — M51.360 DEGENERATION OF INTERVERTEBRAL DISC OF LUMBAR REGION WITH DISCOGENIC BACK PAIN: Primary | ICD-10-CM

## 2024-11-25 ENCOUNTER — HOSPITAL ENCOUNTER (OUTPATIENT)
Age: 69
Discharge: HOME | End: 2024-11-25
Payer: COMMERCIAL

## 2024-11-25 DIAGNOSIS — M51.360 DEGENERATION OF INTERVERTEBRAL DISC OF LUMBAR REGION WITH DISCOGENIC BACK PAIN: ICD-10-CM

## 2024-11-25 PROCEDURE — 72148 MRI LUMBAR SPINE W/O DYE: CPT

## 2024-11-26 ENCOUNTER — PATIENT MESSAGE (OUTPATIENT)
Dept: FAMILY MEDICINE CLINIC | Age: 69
End: 2024-11-26

## 2024-11-26 DIAGNOSIS — M51.360 DEGENERATION OF INTERVERTEBRAL DISC OF LUMBAR REGION WITH DISCOGENIC BACK PAIN: Primary | ICD-10-CM

## 2024-12-11 ENCOUNTER — OFFICE VISIT (OUTPATIENT)
Dept: ORTHOPEDIC SURGERY | Age: 69
End: 2024-12-11
Payer: COMMERCIAL

## 2024-12-11 VITALS — WEIGHT: 199 LBS | BODY MASS INDEX: 30.16 KG/M2 | HEIGHT: 68 IN

## 2024-12-11 DIAGNOSIS — M47.816 LUMBAR FACET ARTHROPATHY: ICD-10-CM

## 2024-12-11 DIAGNOSIS — M79.18 MYOFASCIAL PAIN SYNDROME OF LUMBAR SPINE: ICD-10-CM

## 2024-12-11 DIAGNOSIS — M51.360 DEGENERATION OF INTERVERTEBRAL DISC OF LUMBAR REGION WITH DISCOGENIC BACK PAIN: Primary | ICD-10-CM

## 2024-12-11 PROCEDURE — 99204 OFFICE O/P NEW MOD 45 MIN: CPT | Performed by: PHYSICIAN ASSISTANT

## 2024-12-11 PROCEDURE — 1123F ACP DISCUSS/DSCN MKR DOCD: CPT | Performed by: PHYSICIAN ASSISTANT

## 2024-12-11 RX ORDER — MELOXICAM 7.5 MG/1
7.5 TABLET ORAL DAILY PRN
Qty: 30 TABLET | Refills: 0 | Status: SHIPPED | OUTPATIENT
Start: 2024-12-11

## 2024-12-11 NOTE — PROGRESS NOTES
New Patient: SPINE    12/11/2024     CHIEF COMPLAINT:    Chief Complaint   Patient presents with    New Patient     LUMBAR SPINE     DR. JOHN       HISTORY OF PRESENT ILLNESS:              The patient is a 69 y.o. male history of dermatomyositis, hyperglycemia, SHILPA referred by Luiz John MD for chronic axial back pain.  He reports a 1-1/2-year history of intermittent stabbing right greater than left low back pain.  His symptoms are episodic.  He states at times he experiences a \"muscle spasm\" in his lower back.  His symptoms tend to occur with transition or bending.  He notes pain when scooting over in bed at night.  He reports improvement with stretching.  He is very active he works out 3 times a week with a .  Other conservative care includes chiropractics, Bari.  He had a recent visit with his family doctor whom recommended spine evaluation.  He currently denies any lower extremity radiating pain.  He denies any numbness tingling or extremity weakness.  He denies any recent bowel or bladder dysfunction or saddle anesthesia.  Denies any recent injury or trauma.  Denies any recent fevers chills or infections.  Overall fairly healthy and active.  Past Medical History:   Diagnosis Date    Dermatomyositis ([facility])     Hx SBO     Hyperglycemia     ILD (interstitial lung disease) ([facility]) 08/17/2021    SHILPA (obstructive sleep apnea)       The pain assessment was noted & reviewed in the medical record today.     Current/Past Treatment:   Physical Therapy: Works out with a  3x/week  Chiropractic:  Yes   Injection:     Medications:            NSAIDS: Aleve             Muscle relaxer:              Steriods:              Neuropathic medications:              Opioids:            Other:   Surgery/Consult: no    Work Status/Functionality: [employer] part time     Past Medical History: Medical history form was reviewed today & scanned into the media tab  Past Medical History:

## 2024-12-16 ENCOUNTER — HOSPITAL ENCOUNTER (OUTPATIENT)
Dept: PHYSICAL THERAPY | Age: 69
Setting detail: THERAPIES SERIES
Discharge: HOME | End: 2024-12-16
Payer: COMMERCIAL

## 2024-12-16 DIAGNOSIS — M54.50 CHRONIC RIGHT-SIDED LOW BACK PAIN WITHOUT SCIATICA: Primary | ICD-10-CM

## 2024-12-16 DIAGNOSIS — G89.29 CHRONIC RIGHT-SIDED LOW BACK PAIN WITHOUT SCIATICA: Primary | ICD-10-CM

## 2024-12-16 PROCEDURE — 97161 PT EVAL LOW COMPLEX 20 MIN: CPT

## 2024-12-16 PROCEDURE — 97140 MANUAL THERAPY 1/> REGIONS: CPT

## 2024-12-16 PROCEDURE — 97110 THERAPEUTIC EXERCISES: CPT

## 2024-12-16 NOTE — PLAN OF CARE
rotation  Assistive Device Used: no AD    Balance:  [x] WNL      [] NT       [] Dysfunction noted  Comment:     Falls Risk Assessment (30 days):   Falls Risk assessed and no intervention required.  Time Up and Go (TUG):   Not Assessed        Exercises/Interventions     Therapeutic Ex (75681)  resistance Sets/time Reps Notes/Cues/Progressions   Child's pose to L  10 10    Quadruped rocking flex/ext  1 10    Prone over the table hip extension  3 10                                                     Manual Intervention (03391)  TIME     STM R lumbar paraspinals  x10'                                 NMR re-education (71495) resistance Sets/time Reps CUES NEEDED                                      Therapeutic Activity (73012)  Sets/time                                          Modalities:    No modalities applied this session    Education/Home Exercise Program: Patient HEP program created electronically.  Refer to [employer] access code: [email or web address]      ASSESSMENT   Assessment:   Lucas Pitt is a 69 y.o. male presenting today to Outpatient PT with signs and symptoms consistent with chronic R sided LBP.    Pt. presents with the functional impairments and activity limitations listed below and would benefit from Outpatient PT to address the below impairments as well as improve pain, and restore function.     Functional Impairments:   Noted lumbar/proximal hip hypomobility  Decreased core/proximal hip strength and neuromuscular control   Decreased LE functional strength     Functional Activity Limitations (from functional questionnaire and intake):  Reduced ability to tolerate prolonged functional positions  Reduced ability or difficulty with changes of positions or transfers between positions  Reduced ability to maintain good posture and demonstrate good body mechanics with sitting, bending, and lifting  Reduced ability to perform lifting, reaching, carrying tasks  Reduced ability to squat  Reduced ability to forward

## 2024-12-18 ENCOUNTER — HOSPITAL ENCOUNTER (OUTPATIENT)
Dept: PHYSICAL THERAPY | Age: 69
Setting detail: THERAPIES SERIES
Discharge: HOME | End: 2024-12-18
Payer: COMMERCIAL

## 2024-12-18 DIAGNOSIS — G89.29 CHRONIC RIGHT-SIDED LOW BACK PAIN WITHOUT SCIATICA: Primary | ICD-10-CM

## 2024-12-18 DIAGNOSIS — M54.50 CHRONIC RIGHT-SIDED LOW BACK PAIN WITHOUT SCIATICA: Primary | ICD-10-CM

## 2024-12-18 PROCEDURE — 97140 MANUAL THERAPY 1/> REGIONS: CPT

## 2024-12-18 PROCEDURE — 20560 NDL INSJ W/O NJX 1 OR 2 MUSC: CPT

## 2024-12-18 PROCEDURE — 97110 THERAPEUTIC EXERCISES: CPT

## 2024-12-18 NOTE — FLOWSHEET NOTE
[facility]- Outpatient Rehabilitation and Therapy [address], [place] [address] office: [phone] fax: [phone]         Physical Therapy: TREATMENT/PROGRESS NOTE   Patient: Lucas Pitt (69 y.o. male)   Examination Date: 2024   :  1955 MRN: [id number]   Visit #: 2   Insurance Allowable Auth Needed   BMN []Yes    [x]No    Insurance: Payor: AETNA / Plan: AETNA / Product Type: *No Product type* /   Insurance ID: [id number] - (Commercial)  Secondary Insurance (if applicable):    Treatment Diagnosis:     ICD-10-CM    1. Chronic right-sided low back pain without sciatica  M54.50     G89.29          Medical Diagnosis:  Degeneration of intervertebral disc of lumbar region with discogenic back pain [M51.360]  Myofascial pain syndrome of lumbar spine [M79.18]   Referring Physician: Abimbola Monterroso,*  PCP: Liuz Foreman MD     Plan of care signed (Y/N):     Date of Patient follow up with Physician:      Plan of Care Report: NO  POC update due: (10 visits /OR AUTH LIMITS, whichever is less)  2025                                             Medical History:  Comorbidities:  None  Relevant Medical History: interstitial lung disease, autoimmune (dermatomyositis, sees rheumatologist)                                         Precautions/ Contra-indications:           Latex allergy:  NO  Pacemaker:    NO  Contraindications for Manipulation: None  Date of Surgery: n/a  Other:    Red Flags:  None    Suicide Screening:   The patient did not verbalize a primary behavioral concern, suicidal ideation, suicidal intent, or demonstrate suicidal behaviors.    Preferred Language for Healthcare:   [x] English       [] other:    SUBJECTIVE EXAMINATION     Patient stated complaint: Pt had a good day yesterday. He thinks the manual therapy helped. Pt did a workout yesterday with his  and had a good day. This morning when he first woke up and went to the bathroom, he felt

## 2024-12-23 ENCOUNTER — HOSPITAL ENCOUNTER (OUTPATIENT)
Dept: PHYSICAL THERAPY | Age: 69
Setting detail: THERAPIES SERIES
Discharge: HOME | End: 2024-12-23
Payer: COMMERCIAL

## 2024-12-23 PROCEDURE — 20560 NDL INSJ W/O NJX 1 OR 2 MUSC: CPT

## 2024-12-23 PROCEDURE — 97140 MANUAL THERAPY 1/> REGIONS: CPT

## 2024-12-23 PROCEDURE — 97110 THERAPEUTIC EXERCISES: CPT

## 2024-12-23 NOTE — FLOWSHEET NOTE
Pain Summary VAS 1-7/10 3/10   Functional questionnaire Modified [employer] [place]    Other:                  OBJECTIVE EXAMINATION     12/16/24  ROM/Strength: see eval       Exercises/Interventions     Therapeutic Ex (06198)  resistance Sets/time Reps Notes/Cues/Progressions   Child's pose to L  10\" 10    Quadruped rocking flex/ext  10\"/1 10    Prone over the table hip extension 2# 3 10 bilat   Lumbar rotational stretch   10\" 5 R only, added 12/18   PPT + march 6 5 Added 12/18   sidestepping [place] ([employer] [place]) 20' 3 Added 12/23, will do green at home                               Manual Intervention (57949)  TIME     STM R lumbar paraspinals  x12'     UPA, PA  x3'                          NMR re-education (72074) resistance Sets/time Reps CUES NEEDED                                      Therapeutic Activity (48063)  Sets/time                                          Modalities:    Cold Pack in sitting    MD has given verbal and/or written approval for this treatment.      The patient was given Trigger Point Dry Needling education including anatomy, trigger point etiology, risks and side effects, and expected outcomes, as well as questionnaire on pertinent precautions and contraindications.    The pt was educated, questions answered, and consent was signed and scanned into chart. (See media tab)    The below techniques were used to restore functional range of motion, reduce muscle spasm, and induce healing in the corresponding musculature by means of intramuscular mobilization.  Clean Technique was utilized today while applying the Dry Needling treatment. The treatment sites were cleaned with 70% solution of isopropyl alcohol. The PT washed their hands and utilized treatment gloves along with hand  prior to inserting the needles.  All needles were removed and discarded in the appropriate sharps container.     Muscle  Needle Size Technique Notes   Site 1 Lumbar paraspinals 60 mm [x] Pistoning / []

## 2024-12-30 ENCOUNTER — HOSPITAL ENCOUNTER (OUTPATIENT)
Dept: PHYSICAL THERAPY | Age: 69
Setting detail: THERAPIES SERIES
Discharge: HOME | End: 2024-12-30
Payer: COMMERCIAL

## 2024-12-30 PROCEDURE — 20560 NDL INSJ W/O NJX 1 OR 2 MUSC: CPT

## 2024-12-30 PROCEDURE — 97140 MANUAL THERAPY 1/> REGIONS: CPT

## 2024-12-30 PROCEDURE — 97110 THERAPEUTIC EXERCISES: CPT

## 2024-12-30 NOTE — FLOWSHEET NOTE
[facility]- Outpatient Rehabilitation and Therapy [address], [place] [address] office: [phone] fax: [phone]         Physical Therapy: TREATMENT/PROGRESS NOTE   Patient: Lucas Pitt (69 y.o. male)   Examination Date: 2024   :  1955 MRN: [id number]   Visit #: 4   Insurance Allowable Auth Needed   BMN []Yes    [x]No    Insurance: Payor: AETNA / Plan: AETNA / Product Type: *No Product type* /   Insurance ID: [id number] - (Commercial)  Secondary Insurance (if applicable):    Treatment Diagnosis:     ICD-10-CM    1. Chronic right-sided low back pain without sciatica  M54.50     G89.29          Medical Diagnosis:  Degeneration of intervertebral disc of lumbar region with discogenic back pain [M51.360]  Myofascial pain syndrome of lumbar spine [M79.18]   Referring Physician: Abimbola Monterroso,*  PCP: Luiz Foreman MD     Plan of care signed (Y/N):     Date of Patient follow up with Physician:      Plan of Care Report: NO  POC update due: (10 visits /OR AUTH LIMITS, whichever is less)  2025                                             Medical History:  Comorbidities:  None  Relevant Medical History: interstitial lung disease, autoimmune (dermatomyositis, sees rheumatologist)                                         Precautions/ Contra-indications:           Latex allergy:  NO  Pacemaker:    NO  Contraindications for Manipulation: None  Date of Surgery: n/a  Other:    Red Flags:  None    Suicide Screening:   The patient did not verbalize a primary behavioral concern, suicidal ideation, suicidal intent, or demonstrate suicidal behaviors.    Preferred Language for Healthcare:   [x] English       [] other:    SUBJECTIVE EXAMINATION     Patient stated complaint: Pt had an incident of stepping into a hole while holding some gifts and it mabel him significantly (). Pt worked out on Friday but noticed over the weekend he was more stiff and had more

## 2025-01-06 ENCOUNTER — HOSPITAL ENCOUNTER (OUTPATIENT)
Dept: PHYSICAL THERAPY | Age: 70
Setting detail: THERAPIES SERIES
End: 2025-01-06
Payer: COMMERCIAL

## 2025-01-08 ENCOUNTER — HOSPITAL ENCOUNTER (OUTPATIENT)
Dept: PHYSICAL THERAPY | Age: 70
Setting detail: THERAPIES SERIES
Discharge: HOME | End: 2025-01-08
Payer: COMMERCIAL

## 2025-01-08 ENCOUNTER — OFFICE VISIT (OUTPATIENT)
Dept: ORTHOPEDIC SURGERY | Age: 70
End: 2025-01-08
Payer: COMMERCIAL

## 2025-01-08 VITALS — BODY MASS INDEX: 30.16 KG/M2 | HEIGHT: 68 IN | WEIGHT: 199 LBS

## 2025-01-08 DIAGNOSIS — M47.816 LUMBAR FACET ARTHROPATHY: ICD-10-CM

## 2025-01-08 DIAGNOSIS — M79.18 MYOFASCIAL PAIN SYNDROME OF LUMBAR SPINE: Primary | ICD-10-CM

## 2025-01-08 DIAGNOSIS — M51.360 DEGENERATION OF INTERVERTEBRAL DISC OF LUMBAR REGION WITH DISCOGENIC BACK PAIN: ICD-10-CM

## 2025-01-08 PROCEDURE — 99212 OFFICE O/P EST SF 10 MIN: CPT | Performed by: PHYSICIAN ASSISTANT

## 2025-01-08 PROCEDURE — 97110 THERAPEUTIC EXERCISES: CPT

## 2025-01-08 PROCEDURE — G0283 ELEC STIM OTHER THAN WOUND: HCPCS

## 2025-01-08 PROCEDURE — 1123F ACP DISCUSS/DSCN MKR DOCD: CPT | Performed by: PHYSICIAN ASSISTANT

## 2025-01-08 PROCEDURE — 97140 MANUAL THERAPY 1/> REGIONS: CPT

## 2025-01-08 PROCEDURE — 20560 NDL INSJ W/O NJX 1 OR 2 MUSC: CPT

## 2025-01-08 NOTE — PROGRESS NOTES
Follow up: SPINE    1/8/2025     CHIEF COMPLAINT:    Chief Complaint   Patient presents with    Follow-up     LUMBAR SPINE        HISTORY OF PRESENT ILLNESS:              The patient is a 69 y.o. male history of dermatomyositis, hyperglycemia, SHILPA initially referred by Luiz Foreman MD here to f/u after PT and pharmacologic care for chronic axial back pain.  He reports a 1-2-year history of intermittent stabbing right greater than left low back pain.  His symptoms are episodic.  He states at times he experiences a \"muscle spasm\" in his lower back.  His symptoms typically occurred with transition or bending.  He initially noted pain when scooting over in bed at night.  He reports improvement with stretching.  Conservative care includes PT with dry needling, Mobic, chiropractics, Aleve.  He currently reports at least 80% improvement overall.  He finds PT very beneficial.  He currently denies any lower extremity radiating pain.  He denies any numbness tingling or extremity weakness.  He denies any recent bowel or bladder dysfunction or saddle anesthesia.  Denies any recent injury or trauma.  Denies any recent fevers chills or infections.  Overall fairly healthy and active.  Past Medical History:   Diagnosis Date    Dermatomyositis ([facility])     Hx SBO     Hyperglycemia     ILD (interstitial lung disease) ([facility]) 08/17/2021    SHILPA (obstructive sleep apnea)       The pain assessment was noted & reviewed in the medical record today.     Current/Past Treatment:   Physical Therapy: YES with DN & works out with a  3x/week  Chiropractic:  Yes   Injection:     Medications:            NSAIDS: Aleve, mobic x1            Muscle relaxer:              Steriods:              Neuropathic medications:              Opioids:            Other:   Surgery/Consult: no    Work Status/Functionality: [employer] part time; hobby includes woodworking    Past Medical History: Medical history form was reviewed today & scanned

## 2025-01-08 NOTE — FLOWSHEET NOTE
[facility]- Outpatient Rehabilitation and Therapy [address], [place] [address] office: [phone] fax: [phone]         Physical Therapy: TREATMENT/PROGRESS NOTE   Patient: Lucas Pitt (69 y.o. male)   Examination Date: 2025   :  1955 MRN: [id number]   Visit #: 5   Insurance Allowable Auth Needed   BMN []Yes    [x]No    Insurance: Payor: AETNA / Plan: AETNA / Product Type: *No Product type* /   Insurance ID: [id number] - (Commercial)  Secondary Insurance (if applicable):    Treatment Diagnosis:     ICD-10-CM    1. Chronic right-sided low back pain without sciatica  M54.50     G89.29          Medical Diagnosis:  Degeneration of intervertebral disc of lumbar region with discogenic back pain [M51.360]  Myofascial pain syndrome of lumbar spine [M79.18]   Referring Physician: Abimbola Monterroso,*  PCP: Luiz Foreman MD     Plan of care signed (Y/N):     Date of Patient follow up with Physician:      Plan of Care Report: NO  POC update due: (10 visits /OR AUTH LIMITS, whichever is less)  2025                                             Medical History:  Comorbidities:  None  Relevant Medical History: interstitial lung disease, autoimmune (dermatomyositis, sees rheumatologist)                                         Precautions/ Contra-indications:           Latex allergy:  NO  Pacemaker:    NO  Contraindications for Manipulation: None  Date of Surgery: n/a  Other:    Red Flags:  None    Suicide Screening:   The patient did not verbalize a primary behavioral concern, suicidal ideation, suicidal intent, or demonstrate suicidal behaviors.    Preferred Language for Healthcare:   [x] English       [] other:    SUBJECTIVE EXAMINATION     Patient stated complaint: Pt can say he does feel better from the time he started PT to now. He can go down the steps one after the other with no pain in the morning. Pt did some shoveling with the snow but didn't flare

## 2025-01-13 ENCOUNTER — HOSPITAL ENCOUNTER (OUTPATIENT)
Dept: PHYSICAL THERAPY | Age: 70
Setting detail: THERAPIES SERIES
Discharge: HOME | End: 2025-01-13
Payer: COMMERCIAL

## 2025-01-13 PROCEDURE — 97110 THERAPEUTIC EXERCISES: CPT

## 2025-01-13 PROCEDURE — 20560 NDL INSJ W/O NJX 1 OR 2 MUSC: CPT

## 2025-01-13 PROCEDURE — 97140 MANUAL THERAPY 1/> REGIONS: CPT

## 2025-01-13 PROCEDURE — G0283 ELEC STIM OTHER THAN WOUND: HCPCS

## 2025-01-13 NOTE — FLOWSHEET NOTE
[facility]- Outpatient Rehabilitation and Therapy [address], [place] [address] office: [phone] fax: [phone]         Physical Therapy: TREATMENT/PROGRESS NOTE   Patient: Lucas Pitt (69 y.o. male)   Examination Date: 2025   :  1955 MRN: [id number]   Visit #: 6   Insurance Allowable Auth Needed   BMN []Yes    [x]No    Insurance: Payor: AETNA / Plan: AETNA / Product Type: *No Product type* /   Insurance ID: [id number] - (Commercial)  Secondary Insurance (if applicable):    Treatment Diagnosis:     ICD-10-CM    1. Chronic right-sided low back pain without sciatica  M54.50     G89.29          Medical Diagnosis:  Degeneration of intervertebral disc of lumbar region with discogenic back pain [M51.360]  Myofascial pain syndrome of lumbar spine [M79.18]   Referring Physician: Abimbola Monterroso,*  PCP: Luiz Foreman MD     Plan of care signed (Y/N): Y    Date of Patient follow up with Physician: prn     Plan of Care Report: NO  POC update due: (10 visits /OR AUTH LIMITS, whichever is less)  2025                                             Medical History:  Comorbidities:  None  Relevant Medical History: interstitial lung disease, autoimmune (dermatomyositis, sees rheumatologist)                                         Precautions/ Contra-indications:           Latex allergy:  NO  Pacemaker:    NO  Contraindications for Manipulation: None  Date of Surgery: n/a  Other:    Red Flags:  None    Suicide Screening:   The patient did not verbalize a primary behavioral concern, suicidal ideation, suicidal intent, or demonstrate suicidal behaviors.    Preferred Language for Healthcare:   [x] English       [] other:    SUBJECTIVE EXAMINATION     Patient stated complaint: Pt has continued to feel pretty good overall. He had some aching pain yesterday after shoveling snow on Saturday. Pt continues to have some moments of spasm and grabbing sensations but it is not

## 2025-01-15 ENCOUNTER — HOSPITAL ENCOUNTER (OUTPATIENT)
Dept: PHYSICAL THERAPY | Age: 70
Setting detail: THERAPIES SERIES
Discharge: HOME | End: 2025-01-15
Payer: COMMERCIAL

## 2025-01-15 PROCEDURE — G0283 ELEC STIM OTHER THAN WOUND: HCPCS

## 2025-01-15 PROCEDURE — 20560 NDL INSJ W/O NJX 1 OR 2 MUSC: CPT

## 2025-01-15 PROCEDURE — 97110 THERAPEUTIC EXERCISES: CPT

## 2025-01-15 PROCEDURE — 97140 MANUAL THERAPY 1/> REGIONS: CPT

## 2025-01-15 NOTE — FLOWSHEET NOTE
intervention: [x] Yes  [] No      CHARGE CAPTURE     PT CHARGE GRID   CPT Code (TIMED) minutes # CPT Code (UNTIMED) #     Therex (42312)  25 2  EVAL:LOW (64566 - Typically 20 minutes face-to-face)     Neuromusc. Re-ed (47988)    Re-Eval (95608)     Manual (97245) 13 1  Estim Unattended (55691) 1    Ther. Act (46446)    Mech. Traction (67419)     Gait (06061)    Dry Needle 1-2 muscle (23632) 1    Aquatic Therex (35369)    Dry Needle 3+ muscle (89894)     Iontophoresis (49874)    VASO (13794)     Ultrasound (62900)    Group Therapy (02397)     Estim Attended (64433)    Canalith Repositioning (25463)     Physical Performance Test (71500)    Custom orthotic ()     Other:    Other:    Total Timed Code Tx Minutes 40 3  18     Total Treatment Minutes 60        Charge Justification:  (82428) THERAPEUTIC EXERCISE - Provided verbal/tactile cueing for activities related to strengthening, flexibility, endurance, ROM performed to prevent loss of range of motion, maintain or improve muscular strength or increase flexibility, following either an injury or surgery.   (22382) HOME EXERCISE PROGRAM - Reviewed/Progressed HEP activities related to strengthening, flexibility, endurance, ROM performed to prevent loss of range of motion, maintain or improve muscular strength or increase flexibility, following either an injury or surgery.  (33757) NEUROMUSCULAR RE-EDUCATION - Therapeutic procedure, 1 or more areas, each 15 minutes; neuromuscular reeducation of movement, balance, coordination, kinesthetic sense, posture, and/or proprioception for sitting and/or standing activities  (11897) MANUAL THERAPY -  Manual therapy techniques, 1 or more regions, each 15 minutes (Mobilization/manipulation, manual lymphatic drainage, manual traction) for the purpose of modulating pain, promoting relaxation,  increasing ROM, reducing/eliminating soft tissue swelling/inflammation/restriction, improving soft tissue extensibility and allowing for

## 2025-01-22 ENCOUNTER — HOSPITAL ENCOUNTER (OUTPATIENT)
Dept: PHYSICAL THERAPY | Age: 70
Setting detail: THERAPIES SERIES
Discharge: HOME | End: 2025-01-22
Payer: COMMERCIAL

## 2025-01-22 PROCEDURE — 20560 NDL INSJ W/O NJX 1 OR 2 MUSC: CPT

## 2025-01-22 PROCEDURE — 97140 MANUAL THERAPY 1/> REGIONS: CPT

## 2025-01-22 PROCEDURE — 97110 THERAPEUTIC EXERCISES: CPT

## 2025-01-22 PROCEDURE — G0283 ELEC STIM OTHER THAN WOUND: HCPCS

## 2025-01-22 NOTE — FLOWSHEET NOTE
isopropyl alcohol. The PT washed their hands and utilized treatment gloves along with hand  prior to inserting the needles.  All needles were removed and discarded in the appropriate sharps container.     Muscle  Needle Size Technique Notes   Site 1 Lumbar multifidi 75 mm [x] Pistoning / []  Threading  []  Winding/Coning Bilat L3,4,5   Site 2 Glute med NV [] Pistoning / []  Threading  []  Winding/Coning    Site 3 Glute max NV [] Pistoning / []  Threading  []  Winding/Coning    Site 4   [] Pistoning / []  Threading  []  Winding/Coning    Site 5   [] Pistoning / []  Threading  []  Winding/Coning      Attended low frequency (1-20Hz) electrical stimulation was utilized in conjunction with Dry Needling: the Estim was manipulated between all above needles for a period of 8 min.  at 5-7 volts.  The low frequency electrical stimulation was used to help reduce muscle spasm and help to interrupt /[place] the pain cycle. (94167)       Education/Home Exercise Program: Patient HEP program created electronically.  Refer to QR Artist access code: [email or web address]      ASSESSMENT     Today's Assessment: During therapy this date, patient required visual cueing, verbal cueing, progression of exercises and program, and manual interventions for exercise progression, modulating pain, promoting relaxation, increasing ROM, reduce/eliminate soft tissue swelling/inflammation/restriction, and improving soft tissue extensibility.Patient will continue to benefit from ongoing evaluation and advanced clinical decision from a Physical Therapist to address and improve pain control, ROM, muscle strength, and neuromuscular control to safely return to PLOF and ADLs/IADLs without symptoms or restrictions.    Medical Necessity Documentation:  I certify that this patient meets the below criteria necessary for medical necessity for care and/or justification of therapy services:  The patient has functional impairments and/or activity limitations and would

## 2025-01-27 ENCOUNTER — HOSPITAL ENCOUNTER (OUTPATIENT)
Dept: PHYSICAL THERAPY | Age: 70
Setting detail: THERAPIES SERIES
Discharge: HOME | End: 2025-01-27
Payer: COMMERCIAL

## 2025-01-27 PROCEDURE — 97140 MANUAL THERAPY 1/> REGIONS: CPT

## 2025-01-27 PROCEDURE — 20560 NDL INSJ W/O NJX 1 OR 2 MUSC: CPT

## 2025-01-27 PROCEDURE — 97110 THERAPEUTIC EXERCISES: CPT

## 2025-01-27 PROCEDURE — G0283 ELEC STIM OTHER THAN WOUND: HCPCS

## 2025-01-27 NOTE — PLAN OF CARE
[facility]- Outpatient Rehabilitation and Therapy [address], [place] [address] office: [phone] fax: [phone]      Physical Therapy Re-Certification Plan of Care    Dear Abimbola Monterroso, *  ,    We had the pleasure of treating the following patient for physical therapy services at [facility] Physical Therapy. A summary of our findings can be found in the updated assessment below.  This includes our plan of care.  If you have any questions or concerns regarding these findings, please do not hesitate to contact me at the office phone number checked above.  Thank you for the referral.     Physician Signature:________________________________Date:__________________  By signing above (or electronic signature), therapist's plan is approved by physician      Total Visits: Visit count could not be calculated. Make sure you are using a visit which is associated with an episode.     Overall Response to Treatment:  Patient is responding well to treatment and improvement is noted with regards to goals    Recommendation:    [x] Continue PT 1-2x / wk for 6-8 weeks.   [] Hold PT, pending MD visit   [] Discharge to [facility]. Follow up with PT or MD PRN.       Physical Therapy: TREATMENT/PROGRESS NOTE   Patient: Lucas Pitt (69 y.o. male)   Examination Date: 2025   :  1955 MRN: [id number]   Visit #: 9   Insurance Allowable Auth Needed   BMN []Yes    [x]No    Insurance: Payor: AETNA / Plan: AETNA / Product Type: *No Product type* /   Insurance ID: [id number] - (Commercial)  Secondary Insurance (if applicable):    Treatment Diagnosis:     ICD-10-CM    1. Chronic right-sided low back pain without sciatica  M54.50     G89.29          Medical Diagnosis:  Degeneration of intervertebral disc of lumbar region with discogenic back pain [M51.360]  Myofascial pain syndrome of lumbar spine [M79.18]   Referring Physician: Abimbola Monterroso,*  PCP: Luiz Foreman MD

## 2025-01-29 ENCOUNTER — HOSPITAL ENCOUNTER (OUTPATIENT)
Dept: PHYSICAL THERAPY | Age: 70
Setting detail: THERAPIES SERIES
Discharge: HOME | End: 2025-01-29
Payer: COMMERCIAL

## 2025-01-29 PROCEDURE — G0283 ELEC STIM OTHER THAN WOUND: HCPCS

## 2025-01-29 PROCEDURE — 97140 MANUAL THERAPY 1/> REGIONS: CPT

## 2025-01-29 PROCEDURE — 97110 THERAPEUTIC EXERCISES: CPT

## 2025-01-29 PROCEDURE — 20560 NDL INSJ W/O NJX 1 OR 2 MUSC: CPT

## 2025-01-29 NOTE — FLOWSHEET NOTE
[facility]- Outpatient Rehabilitation and Therapy [address], [place] [address] office: [phone] fax: [phone]            Physical Therapy: TREATMENT/PROGRESS NOTE   Patient: Lucas Pitt (69 y.o. male)   Examination Date: 2025   :  1955 MRN: [id number]   Visit #: 10   Insurance Allowable Auth Needed   BMN []Yes    [x]No    Insurance: Payor: AETNA / Plan: AETNA / Product Type: *No Product type* /   Insurance ID: [id number] - (Commercial)  Secondary Insurance (if applicable):    Treatment Diagnosis:     ICD-10-CM    1. Chronic right-sided low back pain without sciatica  M54.50     G89.29          Medical Diagnosis:  Degeneration of intervertebral disc of lumbar region with discogenic back pain [M51.360]  Myofascial pain syndrome of lumbar spine [M79.18]   Referring Physician: Abimbola Monterroso,*  PCP: Luiz Foreman MD     Plan of care signed (Y/N): Y    Date of Patient follow up with Physician: prn     Plan of Care Report: NO  POC update due: (10 visits /OR AUTH LIMITS, whichever is less)  2025                                             Medical History:  Comorbidities:  None  Relevant Medical History: interstitial lung disease, autoimmune (dermatomyositis, sees rheumatologist)                                         Precautions/ Contra-indications:           Latex allergy:  NO  Pacemaker:    NO  Contraindications for Manipulation: None  Date of Surgery: n/a  Other:    Red Flags:  None    Suicide Screening:   The patient did not verbalize a primary behavioral concern, suicidal ideation, suicidal intent, or demonstrate suicidal behaviors.    Preferred Language for Healthcare:   [x] English       [] other:    SUBJECTIVE EXAMINATION     Patient stated complaint: Pt reports his back feels pretty good. He added 15 minutes to his workout with his . Pt hasn't felt any grabbing episodes.          Test used Initial score  24   Pain

## 2025-02-03 ENCOUNTER — HOSPITAL ENCOUNTER (OUTPATIENT)
Dept: PHYSICAL THERAPY | Age: 70
Setting detail: THERAPIES SERIES
Discharge: HOME | End: 2025-02-03
Payer: COMMERCIAL

## 2025-02-03 PROCEDURE — 97110 THERAPEUTIC EXERCISES: CPT

## 2025-02-03 PROCEDURE — G0283 ELEC STIM OTHER THAN WOUND: HCPCS

## 2025-02-03 PROCEDURE — 20560 NDL INSJ W/O NJX 1 OR 2 MUSC: CPT

## 2025-02-03 PROCEDURE — 97140 MANUAL THERAPY 1/> REGIONS: CPT

## 2025-02-03 NOTE — FLOWSHEET NOTE
scheduled/recommended follow up visits, this note will serve as a discharge from care along with the most recent update on progress.    Ortho Evaluation

## 2025-02-05 ENCOUNTER — HOSPITAL ENCOUNTER (OUTPATIENT)
Dept: PHYSICAL THERAPY | Age: 70
Setting detail: THERAPIES SERIES
Discharge: HOME | End: 2025-02-05
Payer: COMMERCIAL

## 2025-02-05 PROCEDURE — 97110 THERAPEUTIC EXERCISES: CPT

## 2025-02-05 PROCEDURE — 20560 NDL INSJ W/O NJX 1 OR 2 MUSC: CPT

## 2025-02-05 PROCEDURE — G0283 ELEC STIM OTHER THAN WOUND: HCPCS

## 2025-02-05 PROCEDURE — 97140 MANUAL THERAPY 1/> REGIONS: CPT

## 2025-02-05 NOTE — FLOWSHEET NOTE
[facility]- Outpatient Rehabilitation and Therapy [address], [place] [address] office: [phone] fax: [phone]            Physical Therapy: TREATMENT/PROGRESS NOTE   Patient: Lucas Pitt (69 y.o. male)   Examination Date: 2025   :  1955 MRN: [id number]   Visit #: 12   Insurance Allowable Auth Needed   BMN []Yes    [x]No    Insurance: Payor: AETNA / Plan: AETNA / Product Type: *No Product type* /   Insurance ID: [id number] - (Commercial)  Secondary Insurance (if applicable):    Treatment Diagnosis:     ICD-10-CM    1. Chronic right-sided low back pain without sciatica  M54.50     G89.29          Medical Diagnosis:  Degeneration of intervertebral disc of lumbar region with discogenic back pain [M51.360]  Myofascial pain syndrome of lumbar spine [M79.18]   Referring Physician: Abimbola Monterroso,*  PCP: Luiz Foreman MD     Plan of care signed (Y/N): Y    Date of Patient follow up with Physician: prn     Plan of Care Report: NO  POC update due: (10 visits /OR AUTH LIMITS, whichever is less)  2025                                             Medical History:  Comorbidities:  None  Relevant Medical History: interstitial lung disease, autoimmune (dermatomyositis, sees rheumatologist)                                         Precautions/ Contra-indications:           Latex allergy:  NO  Pacemaker:    NO  Contraindications for Manipulation: None  Date of Surgery: n/a  Other:    Red Flags:  None    Suicide Screening:   The patient did not verbalize a primary behavioral concern, suicidal ideation, suicidal intent, or demonstrate suicidal behaviors.    Preferred Language for Healthcare:   [x] English       [] other:    SUBJECTIVE EXAMINATION     Patient stated complaint: Pt reports twisting really seems to tweak his back.          Test used Initial score  24   Pain Summary VAS 1-7/10 0/10   3/10 (worst) comes and goes quickly   Functional

## 2025-02-10 ENCOUNTER — HOSPITAL ENCOUNTER (OUTPATIENT)
Dept: PHYSICAL THERAPY | Age: 70
Setting detail: THERAPIES SERIES
Discharge: HOME | End: 2025-02-10
Payer: COMMERCIAL

## 2025-02-10 PROCEDURE — 97110 THERAPEUTIC EXERCISES: CPT

## 2025-02-10 PROCEDURE — G0283 ELEC STIM OTHER THAN WOUND: HCPCS

## 2025-02-10 PROCEDURE — 20560 NDL INSJ W/O NJX 1 OR 2 MUSC: CPT

## 2025-02-10 PROCEDURE — 97140 MANUAL THERAPY 1/> REGIONS: CPT

## 2025-02-10 NOTE — FLOWSHEET NOTE
[facility]- Outpatient Rehabilitation and Therapy [address], [place] [address] office: [phone] fax: [phone]            Physical Therapy: TREATMENT/PROGRESS NOTE   Patient: Lucas Pitt (69 y.o. male)   Examination Date: 02/10/2025   :  1955 MRN: [id number]   Visit #: 13   Insurance Allowable Auth Needed   BMN []Yes    [x]No    Insurance: Payor: AETNA / Plan: AETNA / Product Type: *No Product type* /   Insurance ID: [id number] - (Commercial)  Secondary Insurance (if applicable):    Treatment Diagnosis:     ICD-10-CM    1. Chronic right-sided low back pain without sciatica  M54.50     G89.29          Medical Diagnosis:  Degeneration of intervertebral disc of lumbar region with discogenic back pain [M51.360]  Myofascial pain syndrome of lumbar spine [M79.18]   Referring Physician: Abimbola Monterroso,*  PCP: Luiz Foreman MD     Plan of care signed (Y/N): Y    Date of Patient follow up with Physician: prn     Plan of Care Report: NO  POC update due: (10 visits /OR AUTH LIMITS, whichever is less)  2025                                             Medical History:  Comorbidities:  None  Relevant Medical History: interstitial lung disease, autoimmune (dermatomyositis, sees rheumatologist)                                         Precautions/ Contra-indications:           Latex allergy:  NO  Pacemaker:    NO  Contraindications for Manipulation: None  Date of Surgery: n/a  Other:    Red Flags:  None    Suicide Screening:   The patient did not verbalize a primary behavioral concern, suicidal ideation, suicidal intent, or demonstrate suicidal behaviors.    Preferred Language for Healthcare:   [x] English       [] other:    SUBJECTIVE EXAMINATION     Patient stated complaint: Pt reports he continues to work out with a . He had no issues with his back over the last few days.          Test used Initial score  12/16/24 02/10/2025   Pain Summary VAS 1-7/10

## 2025-02-11 DIAGNOSIS — M51.360 DEGENERATION OF INTERVERTEBRAL DISC OF LUMBAR REGION WITH DISCOGENIC BACK PAIN: Primary | ICD-10-CM

## 2025-02-11 DIAGNOSIS — M79.18 MYOFASCIAL PAIN SYNDROME OF LUMBAR SPINE: ICD-10-CM

## 2025-02-11 DIAGNOSIS — M47.816 LUMBAR FACET ARTHROPATHY: ICD-10-CM

## 2025-02-11 SDOH — ECONOMIC STABILITY: FOOD INSECURITY: WITHIN THE PAST 12 MONTHS, THE FOOD YOU BOUGHT JUST DIDN'T LAST AND YOU DIDN'T HAVE MONEY TO GET MORE.: NEVER TRUE

## 2025-02-11 SDOH — ECONOMIC STABILITY: INCOME INSECURITY: IN THE LAST 12 MONTHS, WAS THERE A TIME WHEN YOU WERE NOT ABLE TO PAY THE MORTGAGE OR RENT ON TIME?: NO

## 2025-02-11 SDOH — ECONOMIC STABILITY: FOOD INSECURITY: WITHIN THE PAST 12 MONTHS, YOU WORRIED THAT YOUR FOOD WOULD RUN OUT BEFORE YOU GOT MONEY TO BUY MORE.: NEVER TRUE

## 2025-02-11 ASSESSMENT — PATIENT HEALTH QUESTIONNAIRE - PHQ9
SUM OF ALL RESPONSES TO PHQ9 QUESTIONS 1 & 2: 0
SUM OF ALL RESPONSES TO PHQ QUESTIONS 1-9: 0
1. LITTLE INTEREST OR PLEASURE IN DOING THINGS: NOT AT ALL
2. FEELING DOWN, DEPRESSED OR HOPELESS: NOT AT ALL
SUM OF ALL RESPONSES TO PHQ QUESTIONS 1-9: 0
1. LITTLE INTEREST OR PLEASURE IN DOING THINGS: NOT AT ALL
SUM OF ALL RESPONSES TO PHQ QUESTIONS 1-9: 0
SUM OF ALL RESPONSES TO PHQ9 QUESTIONS 1 & 2: 0
2. FEELING DOWN, DEPRESSED OR HOPELESS: NOT AT ALL
SUM OF ALL RESPONSES TO PHQ QUESTIONS 1-9: 0

## 2025-02-12 ENCOUNTER — OFFICE VISIT (OUTPATIENT)
Dept: FAMILY MEDICINE CLINIC | Age: 70
End: 2025-02-12
Payer: COMMERCIAL

## 2025-02-12 ENCOUNTER — HOSPITAL ENCOUNTER (OUTPATIENT)
Dept: PHYSICAL THERAPY | Age: 70
Setting detail: THERAPIES SERIES
Discharge: HOME | End: 2025-02-12
Payer: COMMERCIAL

## 2025-02-12 VITALS
HEART RATE: 77 BPM | DIASTOLIC BLOOD PRESSURE: 60 MMHG | SYSTOLIC BLOOD PRESSURE: 120 MMHG | BODY MASS INDEX: 31.23 KG/M2 | OXYGEN SATURATION: 94 % | TEMPERATURE: 97.7 F | WEIGHT: 205.4 LBS

## 2025-02-12 DIAGNOSIS — R25.1 TREMOR: ICD-10-CM

## 2025-02-12 DIAGNOSIS — R73.9 HYPERGLYCEMIA: ICD-10-CM

## 2025-02-12 DIAGNOSIS — M33.13 DERMATOMYOSITIS (HCC): ICD-10-CM

## 2025-02-12 DIAGNOSIS — M51.360 DEGENERATION OF INTERVERTEBRAL DISC OF LUMBAR REGION WITH DISCOGENIC BACK PAIN: Primary | ICD-10-CM

## 2025-02-12 LAB
ALBUMIN SERPL-MCNC: 4.3 G/DL (ref 3.4–5)
ALBUMIN/GLOB SERPL: 1.8 {RATIO} (ref 1.1–2.2)
ALP SERPL-CCNC: 89 U/L (ref 40–129)
ALT SERPL-CCNC: 36 U/L (ref 10–40)
ANION GAP SERPL CALCULATED.3IONS-SCNC: 9 MMOL/L (ref 3–16)
AST SERPL-CCNC: 31 U/L (ref 15–37)
BILIRUB SERPL-MCNC: 0.3 MG/DL (ref 0–1)
BUN SERPL-MCNC: 19 MG/DL (ref 7–20)
CALCIUM SERPL-MCNC: 9.6 MG/DL (ref 8.3–10.6)
CHLORIDE SERPL-SCNC: 105 MMOL/L (ref 99–110)
CO2 SERPL-SCNC: 29 MMOL/L (ref 21–32)
CREAT SERPL-MCNC: 1.1 MG/DL (ref 0.8–1.3)
GFR SERPLBLD CREATININE-BSD FMLA CKD-EPI: 73 ML/MIN/{1.73_M2}
GLUCOSE SERPL-MCNC: 97 MG/DL (ref 70–99)
MAGNESIUM SERPL-MCNC: 2.17 MG/DL (ref 1.8–2.4)
PHOSPHATE SERPL-MCNC: 3 MG/DL (ref 2.5–4.9)
POTASSIUM SERPL-SCNC: 4.3 MMOL/L (ref 3.5–5.1)
PROT SERPL-MCNC: 6.7 G/DL (ref 6.4–8.2)
SODIUM SERPL-SCNC: 143 MMOL/L (ref 136–145)

## 2025-02-12 PROCEDURE — G0283 ELEC STIM OTHER THAN WOUND: HCPCS

## 2025-02-12 PROCEDURE — 97140 MANUAL THERAPY 1/> REGIONS: CPT

## 2025-02-12 PROCEDURE — 20560 NDL INSJ W/O NJX 1 OR 2 MUSC: CPT

## 2025-02-12 PROCEDURE — 99214 OFFICE O/P EST MOD 30 MIN: CPT | Performed by: FAMILY MEDICINE

## 2025-02-12 PROCEDURE — G2211 COMPLEX E/M VISIT ADD ON: HCPCS | Performed by: FAMILY MEDICINE

## 2025-02-12 PROCEDURE — 1123F ACP DISCUSS/DSCN MKR DOCD: CPT | Performed by: FAMILY MEDICINE

## 2025-02-12 PROCEDURE — 97110 THERAPEUTIC EXERCISES: CPT

## 2025-02-12 NOTE — PROGRESS NOTES
bilateral upper extremities, bilateral lower extremities        Current Outpatient Medications   Medication Sig Dispense Refill    albuterol sulfate HFA (VENTOLIN HFA) 108 (90 Base) MCG/ACT inhaler Inhale 2 puffs into the lungs 4 times daily as needed for Wheezing 18 g 5    vitamin D (CHOLECALCIFEROL) 25 MCG (1000 UT) TABS tablet Take 1 tablet by mouth daily      Multiple Vitamins-Minerals (THERAPEUTIC MULTIVITAMIN-MINERALS) tablet Take 1 tablet by mouth daily      azaTHIOprine (IMURAN) 50 MG tablet Take 2 tablets by mouth daily      aspirin 81 MG chewable tablet Take 1 tablet by mouth daily      meloxicam (MOBIC) 7.5 MG tablet Take 1 tablet by mouth daily as needed for Pain 30 tablet 0     No current facility-administered medications for this visit.         ASSESSMENT / PLAN:    1. Degeneration of intervertebral disc of lumbar region with discogenic back pain  Reviewed MRI  S/p start of physical therapy with improvement in sx  Cont suppportive therapy, range of motion exercises    2. Hyperglycemia  Contlifestyle mgt, diet/exercise    3. Dermatomyositis (HCC)  Stable w/o progressive sx  F/u with rheum  Cont imuran    4. Tremor  Exam nonfocal  Suspect could be benign essential tremor but unusual to have jaw tremor  Remainder neuro exam nromal  cHeck bloodwork as below  Monitor, consider neurology eval for persistent or increased sx  - Comprehensive Metabolic Panel; Future  - Magnesium; Future  - Phosphorus; Future           Follow-up appointment:   Call or return to clinic prn if these symptoms worsen or fail to improve as anticipated.     Discussed use, benefit, and side effects of all prescribed medications.  Barriers to medication compliance addressed.  All patient questions answered.  Pt voiced understanding.  When applicable, patient's outside records were reviewed through Care Everywhere.  The patient has signed appropriate paperworks/consents.

## 2025-02-17 ENCOUNTER — HOSPITAL ENCOUNTER (OUTPATIENT)
Dept: PHYSICAL THERAPY | Age: 70
Setting detail: THERAPIES SERIES
Discharge: HOME | End: 2025-02-17
Payer: COMMERCIAL

## 2025-02-17 PROCEDURE — 20560 NDL INSJ W/O NJX 1 OR 2 MUSC: CPT

## 2025-02-17 PROCEDURE — 97140 MANUAL THERAPY 1/> REGIONS: CPT

## 2025-02-17 PROCEDURE — G0283 ELEC STIM OTHER THAN WOUND: HCPCS

## 2025-02-17 PROCEDURE — 97110 THERAPEUTIC EXERCISES: CPT

## 2025-02-17 NOTE — PLAN OF CARE
[facility]- Outpatient Rehabilitation and Therapy [address], [place] [address] office: [phone] fax: [phone]        Physical Therapy Re-Certification Plan of Care    Dear Abimbola Monterroso, *  ,    We had the pleasure of treating the following patient for physical therapy services at [facility] Outpatient Physical Therapy. A summary of our findings can be found in the updated assessment below.  This includes our plan of care.  If you have any questions or concerns regarding these findings, please do not hesitate to contact me at the office phone number checked above.  Thank you for the referral.     Physician Signature:________________________________Date:__________________  By signing above (or electronic signature), therapist's plan is approved by physician      Total Visits: 15     Overall Response to Treatment:  Patient is responding well to treatment and improvement is noted with regards to goals    Recommendation:    [x] Continue PT 1-2x / wk for 12 weeks.   [] Hold PT, pending MD visit   [] Discharge to [facility]. Follow up with PT or MD PRN.       Physical Therapy: TREATMENT/PROGRESS NOTE   Patient: Lucas Pitt (69 y.o. male)   Examination Date: 2025   :  1955 MRN: [id number]   Visit #: 15   Insurance Allowable Auth Needed   BMN []Yes    [x]No    Insurance: Payor: AETNA / Plan: AETNA / Product Type: *No Product type* /   Insurance ID: [id number] - (Commercial)  Secondary Insurance (if applicable):    Treatment Diagnosis:     ICD-10-CM    1. Chronic right-sided low back pain without sciatica  M54.50     G89.29          Medical Diagnosis:  Degeneration of intervertebral disc of lumbar region with discogenic back pain [M51.360]  Myofascial pain syndrome of lumbar spine [M79.18]   Referring Physician: Abimbola Monterroso,*  PCP: Luiz Foreman MD     Plan of care signed (Y/N): Y    Date of Patient follow up with Physician: prn     Plan of Care

## 2025-02-19 ENCOUNTER — HOSPITAL ENCOUNTER (OUTPATIENT)
Dept: PHYSICAL THERAPY | Age: 70
Setting detail: THERAPIES SERIES
Discharge: HOME | End: 2025-02-19
Payer: COMMERCIAL

## 2025-02-19 ENCOUNTER — APPOINTMENT (OUTPATIENT)
Dept: PHYSICAL THERAPY | Age: 70
End: 2025-02-19
Payer: COMMERCIAL

## 2025-02-19 PROCEDURE — 20560 NDL INSJ W/O NJX 1 OR 2 MUSC: CPT

## 2025-02-19 PROCEDURE — G0283 ELEC STIM OTHER THAN WOUND: HCPCS

## 2025-02-19 PROCEDURE — 97140 MANUAL THERAPY 1/> REGIONS: CPT

## 2025-02-19 PROCEDURE — 97110 THERAPEUTIC EXERCISES: CPT

## 2025-02-19 NOTE — FLOWSHEET NOTE
[facility]- Outpatient Rehabilitation and Therapy [address], [place] [address] office: [phone] fax: [phone]            Physical Therapy: TREATMENT/PROGRESS NOTE   Patient: Lucas Pitt (69 y.o. male)   Examination Date: 2025   :  1955 MRN: [id number]   Visit #: 16   Insurance Allowable Auth Needed   BMN []Yes    [x]No    Insurance: Payor: AETNA / Plan: AETNA / Product Type: *No Product type* /   Insurance ID: [id number] - (Commercial)  Secondary Insurance (if applicable):    Treatment Diagnosis:     ICD-10-CM    1. Chronic right-sided low back pain without sciatica  M54.50     G89.29          Medical Diagnosis:  Degeneration of intervertebral disc of lumbar region with discogenic back pain [M51.360]  Myofascial pain syndrome of lumbar spine [M79.18]   Referring Physician: Abimbola Monterroso,*  PCP: Luiz Foreman MD     Plan of care signed (Y/N): Y    Date of Patient follow up with Physician: prn     Plan of Care Report: NO  POC update due: (10 visits /OR AUTH LIMITS, whichever is less)  3/17/2025                                             Medical History:  Comorbidities:  None  Relevant Medical History: interstitial lung disease, autoimmune (dermatomyositis, sees rheumatologist)                                         Precautions/ Contra-indications:           Latex allergy:  NO  Pacemaker:    NO  Contraindications for Manipulation: None  Date of Surgery: n/a  Other:    Red Flags:  None    Suicide Screening:   The patient did not verbalize a primary behavioral concern, suicidal ideation, suicidal intent, or demonstrate suicidal behaviors.    Preferred Language for Healthcare:   [x] English       [] other:    SUBJECTIVE EXAMINATION     Patient stated complaint: Pt feels a little stiff this morning. He worked a full day yesterday which is not a usual activity for him. Pt felt ok in the evening.          Test used Initial score  24

## 2025-02-24 ENCOUNTER — HOSPITAL ENCOUNTER (OUTPATIENT)
Dept: PHYSICAL THERAPY | Age: 70
Setting detail: THERAPIES SERIES
Discharge: HOME | End: 2025-02-24
Payer: COMMERCIAL

## 2025-02-24 PROCEDURE — G0283 ELEC STIM OTHER THAN WOUND: HCPCS

## 2025-02-24 PROCEDURE — 97140 MANUAL THERAPY 1/> REGIONS: CPT

## 2025-02-24 PROCEDURE — 20560 NDL INSJ W/O NJX 1 OR 2 MUSC: CPT

## 2025-02-24 PROCEDURE — 97110 THERAPEUTIC EXERCISES: CPT

## 2025-02-24 NOTE — FLOWSHEET NOTE
[facility]- Outpatient Rehabilitation and Therapy [address], [place] [address] office: [phone] fax: [phone]            Physical Therapy: TREATMENT/PROGRESS NOTE   Patient: Lucas Pitt (69 y.o. male)   Examination Date: 2025   :  1955 MRN: [id number]   Visit #: 17   Insurance Allowable Auth Needed   BMN []Yes    [x]No    Insurance: Payor: AETNA / Plan: AETNA / Product Type: *No Product type* /   Insurance ID: [id number] - (Commercial)  Secondary Insurance (if applicable):    Treatment Diagnosis:     ICD-10-CM    1. Chronic right-sided low back pain without sciatica  M54.50     G89.29          Medical Diagnosis:  Degeneration of intervertebral disc of lumbar region with discogenic back pain [M51.360]  Myofascial pain syndrome of lumbar spine [M79.18]   Referring Physician: Abimbola Monterroso,*  PCP: Luiz Foreman MD     Plan of care signed (Y/N): Y    Date of Patient follow up with Physician: prn     Plan of Care Report: NO  POC update due: (10 visits /OR AUTH LIMITS, whichever is less)  3/17/2025                                             Medical History:  Comorbidities:  None  Relevant Medical History: interstitial lung disease, autoimmune (dermatomyositis, sees rheumatologist)                                         Precautions/ Contra-indications:           Latex allergy:  NO  Pacemaker:    NO  Contraindications for Manipulation: None  Date of Surgery: n/a  Other:    Red Flags:  None    Suicide Screening:   The patient did not verbalize a primary behavioral concern, suicidal ideation, suicidal intent, or demonstrate suicidal behaviors.    Preferred Language for Healthcare:   [x] English       [] other:    SUBJECTIVE EXAMINATION     Patient stated complaint: Pt reports he has had a pretty good week, noting very little issues in his low back.          Test used Initial score  24   Pain Summary VAS 1-7/10 1/10 \"stiffness\"  3/10

## 2025-02-26 ENCOUNTER — HOSPITAL ENCOUNTER (OUTPATIENT)
Dept: PHYSICAL THERAPY | Age: 70
Setting detail: THERAPIES SERIES
Discharge: HOME | End: 2025-02-26
Payer: COMMERCIAL

## 2025-02-26 PROCEDURE — 20560 NDL INSJ W/O NJX 1 OR 2 MUSC: CPT

## 2025-02-26 PROCEDURE — G0283 ELEC STIM OTHER THAN WOUND: HCPCS

## 2025-02-26 PROCEDURE — 97140 MANUAL THERAPY 1/> REGIONS: CPT

## 2025-02-26 PROCEDURE — 97110 THERAPEUTIC EXERCISES: CPT

## 2025-02-26 NOTE — FLOWSHEET NOTE
[facility]- Outpatient Rehabilitation and Therapy [address], [place] [address] office: [phone] fax: [phone]            Physical Therapy: TREATMENT/PROGRESS NOTE   Patient: Lucas Pitt (69 y.o. male)   Examination Date: 2025   :  1955 MRN: [id number]   Visit #: 18   Insurance Allowable Auth Needed   BMN []Yes    [x]No    Insurance: Payor: AETNA / Plan: AETNA / Product Type: *No Product type* /   Insurance ID: [id number] - (Commercial)  Secondary Insurance (if applicable):    Treatment Diagnosis:     ICD-10-CM    1. Chronic right-sided low back pain without sciatica  M54.50     G89.29          Medical Diagnosis:  Degeneration of intervertebral disc of lumbar region with discogenic back pain [M51.360]  Myofascial pain syndrome of lumbar spine [M79.18]   Referring Physician: Abimbola Monterorso,*  PCP: Luiz Foreman MD     Plan of care signed (Y/N): Y    Date of Patient follow up with Physician: prn     Plan of Care Report: NO  POC update due: (10 visits /OR AUTH LIMITS, whichever is less)  3/17/2025                                             Medical History:  Comorbidities:  None  Relevant Medical History: interstitial lung disease, autoimmune (dermatomyositis, sees rheumatologist)                                         Precautions/ Contra-indications:           Latex allergy:  NO  Pacemaker:    NO  Contraindications for Manipulation: None  Date of Surgery: n/a  Other:    Red Flags:  None    Suicide Screening:   The patient did not verbalize a primary behavioral concern, suicidal ideation, suicidal intent, or demonstrate suicidal behaviors.    Preferred Language for Healthcare:   [x] English       [] other:    SUBJECTIVE EXAMINATION     Patient stated complaint: Pt reports some stiffness that seems to be minimal to moderate intermittently. Pt was driving a lot yesterday and doing some work involving being in different positions.          Test used

## 2025-03-03 ENCOUNTER — HOSPITAL ENCOUNTER (OUTPATIENT)
Dept: PHYSICAL THERAPY | Age: 70
Setting detail: THERAPIES SERIES
Discharge: HOME | End: 2025-03-03
Payer: COMMERCIAL

## 2025-03-03 PROCEDURE — 97110 THERAPEUTIC EXERCISES: CPT

## 2025-03-03 PROCEDURE — 97140 MANUAL THERAPY 1/> REGIONS: CPT

## 2025-03-03 PROCEDURE — G0283 ELEC STIM OTHER THAN WOUND: HCPCS

## 2025-03-03 PROCEDURE — 20560 NDL INSJ W/O NJX 1 OR 2 MUSC: CPT

## 2025-03-03 NOTE — FLOWSHEET NOTE
[facility]- Outpatient Rehabilitation and Therapy [address], [place] [address] office: [phone] fax: [phone]            Physical Therapy: TREATMENT/PROGRESS NOTE   Patient: Lucas Pitt (69 y.o. male)   Examination Date: 2025   :  1955 MRN: [id number]   Visit #: 19   Insurance Allowable Auth Needed   BMN []Yes    [x]No    Insurance: Payor: AETNA / Plan: AETNA / Product Type: *No Product type* /   Insurance ID: [id number] - (Commercial)  Secondary Insurance (if applicable):    Treatment Diagnosis:     ICD-10-CM    1. Chronic right-sided low back pain without sciatica  M54.50     G89.29          Medical Diagnosis:  Degeneration of intervertebral disc of lumbar region with discogenic back pain [M51.360]  Myofascial pain syndrome of lumbar spine [M79.18]   Referring Physician: Abimbola Monterroso,*  PCP: Luiz Foreman MD     Plan of care signed (Y/N): Y    Date of Patient follow up with Physician: prn     Plan of Care Report: NO  POC update due: (10 visits /OR AUTH LIMITS, whichever is less)  3/17/2025                                             Medical History:  Comorbidities:  None  Relevant Medical History: interstitial lung disease, autoimmune (dermatomyositis, sees rheumatologist)                                         Precautions/ Contra-indications:           Latex allergy:  NO  Pacemaker:    NO  Contraindications for Manipulation: None  Date of Surgery: n/a  Other:    Red Flags:  None    Suicide Screening:   The patient did not verbalize a primary behavioral concern, suicidal ideation, suicidal intent, or demonstrate suicidal behaviors.    Preferred Language for Healthcare:   [x] English       [] other:    SUBJECTIVE EXAMINATION     Patient stated complaint: Pt reports he had a pretty good weekend overall. Pt has his massage today.          Test used Initial score  24   Pain Summary VAS 1-7/10 2-3/10 \"stiff and tight\"

## 2025-03-10 ENCOUNTER — HOSPITAL ENCOUNTER (OUTPATIENT)
Dept: PHYSICAL THERAPY | Age: 70
Setting detail: THERAPIES SERIES
Discharge: HOME | End: 2025-03-10
Payer: COMMERCIAL

## 2025-03-10 PROCEDURE — 20560 NDL INSJ W/O NJX 1 OR 2 MUSC: CPT

## 2025-03-10 PROCEDURE — 97110 THERAPEUTIC EXERCISES: CPT

## 2025-03-10 PROCEDURE — G0283 ELEC STIM OTHER THAN WOUND: HCPCS

## 2025-03-10 PROCEDURE — 97140 MANUAL THERAPY 1/> REGIONS: CPT

## 2025-03-10 NOTE — FLOWSHEET NOTE
[facility]- Outpatient Rehabilitation and Therapy [address], [place] [address] office: [phone] fax: [phone]            Physical Therapy: TREATMENT/PROGRESS NOTE   Patient: Lucas Pitt (69 y.o. male)   Examination Date: 03/10/2025   :  1955 MRN: [id number]   Visit #: 20   Insurance Allowable Auth Needed   BMN []Yes    [x]No    Insurance: Payor: AETNA / Plan: AETNA / Product Type: *No Product type* /   Insurance ID: [id number] - (Commercial)  Secondary Insurance (if applicable):    Treatment Diagnosis:     ICD-10-CM    1. Chronic right-sided low back pain without sciatica  M54.50     G89.29          Medical Diagnosis:  Degeneration of intervertebral disc of lumbar region with discogenic back pain [M51.360]  Myofascial pain syndrome of lumbar spine [M79.18]   Referring Physician: Abimbola Monterroso,*  PCP: Luiz Foreman MD     Plan of care signed (Y/N): Y    Date of Patient follow up with Physician: prn     Plan of Care Report: NO  POC update due: (10 visits /OR AUTH LIMITS, whichever is less)  3/17/2025                                             Medical History:  Comorbidities:  None  Relevant Medical History: interstitial lung disease, autoimmune (dermatomyositis, sees rheumatologist)                                         Precautions/ Contra-indications:           Latex allergy:  NO  Pacemaker:    NO  Contraindications for Manipulation: None  Date of Surgery: n/a  Other:    Red Flags:  None    Suicide Screening:   The patient did not verbalize a primary behavioral concern, suicidal ideation, suicidal intent, or demonstrate suicidal behaviors.    Preferred Language for Healthcare:   [x] English       [] other:    SUBJECTIVE EXAMINATION     Patient stated complaint: Pt reports he had a massage last week and is still a little tender from some of the places she pushed harder on, notably on glute muscles. Pt has felt ok otherwise though, he's had no flare

## 2025-03-17 ENCOUNTER — APPOINTMENT (OUTPATIENT)
Dept: PHYSICAL THERAPY | Age: 70
End: 2025-03-17
Payer: COMMERCIAL

## 2025-03-19 ENCOUNTER — HOSPITAL ENCOUNTER (OUTPATIENT)
Dept: PHYSICAL THERAPY | Age: 70
Setting detail: THERAPIES SERIES
Discharge: HOME | End: 2025-03-19
Payer: COMMERCIAL

## 2025-03-19 PROCEDURE — 97140 MANUAL THERAPY 1/> REGIONS: CPT

## 2025-03-19 PROCEDURE — G0283 ELEC STIM OTHER THAN WOUND: HCPCS

## 2025-03-19 PROCEDURE — 20560 NDL INSJ W/O NJX 1 OR 2 MUSC: CPT

## 2025-03-19 PROCEDURE — 97110 THERAPEUTIC EXERCISES: CPT

## 2025-03-19 NOTE — PLAN OF CARE
[facility]- Outpatient Rehabilitation and Therapy [address], [place] [address] office: [phone] fax: [phone]          Physical Therapy Re-Certification Plan of Care    Dear Abimbola Monterroso, *  ,    We had the pleasure of treating the following patient for physical therapy services at [facility]. A summary of our findings can be found in the updated assessment below.  This includes our plan of care.  If you have any questions or concerns regarding these findings, please do not hesitate to contact me at the office phone number checked above.  Thank you for the referral.     Physician Signature:________________________________Date:__________________  By signing above (or electronic signature), therapist's plan is approved by physician      Total Visits: 21     Overall Response to Treatment:  Patient is responding well to treatment and improvement is noted with regards to goals    Recommendation:    [x] Continue PT 1x / wk for 2-4 weeks.   [] Hold PT, pending MD visit   [] Discharge to [facility]. Follow up with PT or MD PRN.     Physical Therapy: TREATMENT/PROGRESS NOTE   Patient: Lucas Pitt (69 y.o. male)   Examination Date: 2025   :  1955 MRN: [id number]   Visit #: 21   Insurance Allowable Auth Needed   BMN []Yes    [x]No    Insurance: Payor: AETNA / Plan: AETNA / Product Type: *No Product type* /   Insurance ID: [id number] - (Commercial)  Secondary Insurance (if applicable):    Treatment Diagnosis:     ICD-10-CM    1. Chronic right-sided low back pain without sciatica  M54.50     G89.29          Medical Diagnosis:  Degeneration of intervertebral disc of lumbar region with discogenic back pain [M51.360]  Myofascial pain syndrome of lumbar spine [M79.18]   Referring Physician: Abimbola Monterroso,*  PCP: Luiz Foreman MD     Plan of care signed (Y/N): Y    Date of Patient follow up with Physician: prn     Plan of Care

## 2025-03-26 ENCOUNTER — HOSPITAL ENCOUNTER (OUTPATIENT)
Dept: PHYSICAL THERAPY | Age: 70
Setting detail: THERAPIES SERIES
Discharge: HOME | End: 2025-03-26
Payer: COMMERCIAL

## 2025-03-26 PROCEDURE — G0283 ELEC STIM OTHER THAN WOUND: HCPCS

## 2025-03-26 PROCEDURE — 97110 THERAPEUTIC EXERCISES: CPT

## 2025-03-26 PROCEDURE — 97140 MANUAL THERAPY 1/> REGIONS: CPT

## 2025-03-26 PROCEDURE — 20560 NDL INSJ W/O NJX 1 OR 2 MUSC: CPT

## 2025-03-26 NOTE — FLOWSHEET NOTE
up with physician  [] Other:     TREATMENT PLAN       Interventions:  Therapeutic Exercise (67062) including: strength training, ROM, and functional mobility  Therapeutic Activities (57625) including: functional mobility training and education.  Neuromuscular Re-education (69977) activation and proprioception, including postural re-education.    Manual Therapy (69231) as indicated to include: Passive Range of Motion, Gr I-IV mobilizations, Grade V Manipulation, Dry Needling/IASTM, Trigger Point Release, and Myofascial Release  Modalities as needed that may include: Cryotherapy, Electrical Stimulation, Thermal Agents, and Traction  Patient education on postural re-education, activity modification, and progression of HEP    Plan: Cont POC- Continue emphasis/focus on exercise progression, improving proper muscle recruitment and activation/motor control patterns, reduce/eliminate soft tissue swelling/inflammation/restriction, and improving soft tissue extensibility. Next visit plan to progress weights, look to Dry Needle or other manual technique, and look to DC      Electronically Signed by Ange Hooks, PT  Date: 03/26/2025     Note: Portions of this note have been templated and/or copied from initial evaluation, reassessments and prior notes for documentation efficiency.    Note: If patient does not return for scheduled/recommended follow up visits, this note will serve as a discharge from care along with the most recent update on progress.    Ortho Evaluation

## 2025-03-31 ENCOUNTER — HOSPITAL ENCOUNTER (OUTPATIENT)
Dept: PHYSICAL THERAPY | Age: 70
Setting detail: THERAPIES SERIES
Discharge: HOME | End: 2025-03-31
Payer: COMMERCIAL

## 2025-03-31 PROCEDURE — G0283 ELEC STIM OTHER THAN WOUND: HCPCS

## 2025-03-31 PROCEDURE — 97140 MANUAL THERAPY 1/> REGIONS: CPT

## 2025-03-31 PROCEDURE — 97110 THERAPEUTIC EXERCISES: CPT

## 2025-03-31 PROCEDURE — 20560 NDL INSJ W/O NJX 1 OR 2 MUSC: CPT

## 2025-03-31 NOTE — FLOWSHEET NOTE
[facility]- Outpatient Rehabilitation and Therapy [address], [place] [address] office: [phone] fax: [phone]            Physical Therapy: TREATMENT/PROGRESS NOTE   Patient: Lucas Pitt (69 y.o. male)   Examination Date: 2025   :  1955 MRN: [id number]   Visit #: 23   Insurance Allowable Auth Needed   BMN []Yes    [x]No    Insurance: Payor: AETNA / Plan: AETNA / Product Type: *No Product type* /   Insurance ID: [id number] - (Commercial)  Secondary Insurance (if applicable):    Treatment Diagnosis:     ICD-10-CM    1. Chronic right-sided low back pain without sciatica  M54.50     G89.29          Medical Diagnosis:  Degeneration of intervertebral disc of lumbar region with discogenic back pain [M51.360]  Myofascial pain syndrome of lumbar spine [M79.18]   Referring Physician: Abimbola Monterroso,*  PCP: Luiz Foreman MD     Plan of care signed (Y/N): Y    Date of Patient follow up with Physician: prn     Plan of Care Report: NO  POC update due: (10 visits /OR AUTH LIMITS, whichever is less)  2025                                             Medical History:  Comorbidities:  None  Relevant Medical History: interstitial lung disease, autoimmune (dermatomyositis, sees rheumatologist)                                         Precautions/ Contra-indications:           Latex allergy:  NO  Pacemaker:    NO  Contraindications for Manipulation: None  Date of Surgery: n/a  Other:    Red Flags:  None    Suicide Screening:   The patient did not verbalize a primary behavioral concern, suicidal ideation, suicidal intent, or demonstrate suicidal behaviors.    Preferred Language for Healthcare:   [x] English       [] other:    SUBJECTIVE EXAMINATION     Patient stated complaint: Pt reports he was pretty sore after doing some yardwork and moving tools around this weekend. Pt did have one instance of grabbing in lower right side after coughing. Pt feels some low level

## 2025-04-16 ENCOUNTER — TELEPHONE (OUTPATIENT)
Dept: FAMILY MEDICINE CLINIC | Age: 70
End: 2025-04-16

## 2025-04-16 ENCOUNTER — OFFICE VISIT (OUTPATIENT)
Dept: FAMILY MEDICINE CLINIC | Age: 70
End: 2025-04-16
Payer: COMMERCIAL

## 2025-04-16 VITALS
WEIGHT: 208.6 LBS | DIASTOLIC BLOOD PRESSURE: 86 MMHG | BODY MASS INDEX: 31.72 KG/M2 | RESPIRATION RATE: 16 BRPM | OXYGEN SATURATION: 94 % | TEMPERATURE: 97.7 F | HEART RATE: 93 BPM | SYSTOLIC BLOOD PRESSURE: 126 MMHG

## 2025-04-16 DIAGNOSIS — J01.90 ACUTE BACTERIAL SINUSITIS: Primary | ICD-10-CM

## 2025-04-16 DIAGNOSIS — B96.89 ACUTE BACTERIAL SINUSITIS: Primary | ICD-10-CM

## 2025-04-16 PROCEDURE — 1123F ACP DISCUSS/DSCN MKR DOCD: CPT | Performed by: NURSE PRACTITIONER

## 2025-04-16 PROCEDURE — 99214 OFFICE O/P EST MOD 30 MIN: CPT | Performed by: NURSE PRACTITIONER

## 2025-04-16 ASSESSMENT — ENCOUNTER SYMPTOMS
SHORTNESS OF BREATH: 0
SINUS PRESSURE: 1
COUGH: 1
SINUS PAIN: 1
RHINORRHEA: 1
SORE THROAT: 0
GASTROINTESTINAL NEGATIVE: 1

## 2025-04-16 NOTE — TELEPHONE ENCOUNTER
Patient called stating they have been coughing with green d/c, sinus congestion and headache, and facial pressure. No Fever, no chills. PT attempted to complete an e-visit, but it stated they needed to be seen. Scheduled patient with Raisa today at 12:15 PM for acute care visit.

## 2025-04-16 NOTE — ASSESSMENT & PLAN NOTE
Sinus infection symptoms since Sunday.   Not improving.  Augmentin to pharmacy.  Tyl/Mot for discomfort  Notify me if symptoms worsen or do not improve.

## 2025-04-16 NOTE — PROGRESS NOTES
Lucas Pitt (:  1955) is a 69 y.o. male,Established patient, here for evaluation of the following chief complaint(s):  Cough (Cough w/green d/c, sinus hx, facial pressure)      ASSESSMENT/PLAN:  1. Acute bacterial sinusitis  Assessment & Plan:  Sinus infection symptoms since .   Not improving.  Augmentin to pharmacy.  Tyl/Mot for discomfort  Notify me if symptoms worsen or do not improve.  Orders:  -     amoxicillin-clavulanate (AUGMENTIN) 875-125 MG per tablet; Take 1 tablet by mouth 2 times daily for 7 days, Disp-14 tablet, R-0Normal      No follow-ups on file.    SUBJECTIVE/OBJECTIVE:  Alan presents today with complaints of sinus pain, pressure, postnasal drip, rhinorrhea, productive cough, and congestion. These symptoms have been present since  and are worsening. He reports otc cold medications which have not helped. He denies fever, GI symptoms, and myalgias.      Current Outpatient Medications   Medication Sig Dispense Refill    amoxicillin-clavulanate (AUGMENTIN) 875-125 MG per tablet Take 1 tablet by mouth 2 times daily for 7 days 14 tablet 0    albuterol sulfate HFA (VENTOLIN HFA) 108 (90 Base) MCG/ACT inhaler Inhale 2 puffs into the lungs 4 times daily as needed for Wheezing 18 g 5    vitamin D (CHOLECALCIFEROL) 25 MCG (1000 UT) TABS tablet Take 1 tablet by mouth daily      Multiple Vitamins-Minerals (THERAPEUTIC MULTIVITAMIN-MINERALS) tablet Take 1 tablet by mouth daily      azaTHIOprine (IMURAN) 50 MG tablet Take 2 tablets by mouth daily      aspirin 81 MG chewable tablet Take 1 tablet by mouth daily      meloxicam (MOBIC) 7.5 MG tablet Take 1 tablet by mouth daily as needed for Pain 30 tablet 0     No current facility-administered medications for this visit.         Review of Systems   Constitutional:  Positive for fatigue. Negative for fever.   HENT:  Positive for congestion, postnasal drip, rhinorrhea, sinus pressure and sinus pain. Negative for ear pain and sore throat.

## 2025-04-25 ENCOUNTER — PATIENT MESSAGE (OUTPATIENT)
Dept: FAMILY MEDICINE CLINIC | Age: 70
End: 2025-04-25

## 2025-04-25 RX ORDER — DOXYCYCLINE HYCLATE 100 MG
100 TABLET ORAL 2 TIMES DAILY
Qty: 14 TABLET | Refills: 0 | Status: SHIPPED | OUTPATIENT
Start: 2025-04-25 | End: 2025-05-02

## 2025-05-23 ENCOUNTER — HOSPITAL ENCOUNTER (OUTPATIENT)
Dept: PULMONOLOGY | Age: 70
Discharge: HOME | End: 2025-05-23
Attending: INTERNAL MEDICINE
Payer: COMMERCIAL

## 2025-05-23 DIAGNOSIS — J44.9 OBSTRUCTIVE LUNG DISEASE (HCC): ICD-10-CM

## 2025-05-23 DIAGNOSIS — J84.9 ILD (INTERSTITIAL LUNG DISEASE) (HCC): ICD-10-CM

## 2025-05-23 PROCEDURE — 94760 N-INVAS EAR/PLS OXIMETRY 1: CPT

## 2025-05-23 PROCEDURE — 94726 PLETHYSMOGRAPHY LUNG VOLUMES: CPT

## 2025-05-23 PROCEDURE — 94664 DEMO&/EVAL PT USE INHALER: CPT

## 2025-05-23 PROCEDURE — 94060 EVALUATION OF WHEEZING: CPT

## 2025-05-23 PROCEDURE — 94729 DIFFUSING CAPACITY: CPT

## 2025-05-23 PROCEDURE — 6360000002 HC RX W HCPCS: Performed by: INTERNAL MEDICINE

## 2025-05-23 RX ORDER — ALBUTEROL SULFATE 0.83 MG/ML
2.5 SOLUTION RESPIRATORY (INHALATION) ONCE
Status: COMPLETED | OUTPATIENT
Start: 2025-05-23 | End: 2025-05-23

## 2025-05-23 RX ADMIN — ALBUTEROL SULFATE 2.5 MG: 2.5 SOLUTION RESPIRATORY (INHALATION) at 11:11

## 2025-06-02 ENCOUNTER — OFFICE VISIT (OUTPATIENT)
Age: 70
End: 2025-06-02
Payer: COMMERCIAL

## 2025-06-02 VITALS
HEIGHT: 68 IN | OXYGEN SATURATION: 94 % | HEART RATE: 111 BPM | SYSTOLIC BLOOD PRESSURE: 128 MMHG | DIASTOLIC BLOOD PRESSURE: 66 MMHG | BODY MASS INDEX: 30.92 KG/M2 | WEIGHT: 204 LBS

## 2025-06-02 DIAGNOSIS — J84.9 ILD (INTERSTITIAL LUNG DISEASE) (HCC): Primary | ICD-10-CM

## 2025-06-02 DIAGNOSIS — J44.9 OBSTRUCTIVE LUNG DISEASE (HCC): ICD-10-CM

## 2025-06-02 DIAGNOSIS — D84.9 IMMUNOCOMPROMISED: ICD-10-CM

## 2025-06-02 PROBLEM — R05.3 CHRONIC COUGH: Status: RESOLVED | Noted: 2024-07-31 | Resolved: 2025-06-02

## 2025-06-02 PROCEDURE — 99214 OFFICE O/P EST MOD 30 MIN: CPT | Performed by: INTERNAL MEDICINE

## 2025-06-02 PROCEDURE — G2211 COMPLEX E/M VISIT ADD ON: HCPCS | Performed by: INTERNAL MEDICINE

## 2025-06-02 PROCEDURE — 1123F ACP DISCUSS/DSCN MKR DOCD: CPT | Performed by: INTERNAL MEDICINE

## 2025-06-02 RX ORDER — FLUTICASONE FUROATE AND VILANTEROL 200; 25 UG/1; UG/1
1 POWDER RESPIRATORY (INHALATION)
Qty: 60 EACH | Refills: 5 | Status: SHIPPED | OUTPATIENT
Start: 2025-06-02

## 2025-06-02 NOTE — ASSESSMENT & PLAN NOTE
Obstruction noted on PFTs, patient lacks exposures and has no history of asthma. Unclear etiology, suspect some degree of bronchiolitis 2/2 CTD-ILD  -Started Breo 200, provided a list of other alternatives if Breo is not covered. Side effect profile extensively discussed, recommended to rinse mouth and spit after each use since ICS carry risk of oral thrush and/or periodontal disease.    -Continue SVITLANA PRN

## 2025-06-02 NOTE — PROGRESS NOTES
[facility]UL[facility]IT[facility]    OUTPATIENT INITIAL NOTE    SUBJECTIVE:   Referring Physician: Luiz Foreman MD    CHIEF COMPLAINT / HPI:    Lucas is a 70 y.o. male that initially presented to clinic for evaluation of chronic cough  Has been dealing with it for 6 weeks. CT obtained by PCP showed chronic fibrotic changes.   Has history of dermatomyositis since 2011 for which he follows with Rheumatology, currently on Imuran.   Reported history of pulmonary histoplasmosis.   Also saw pulmonology in 2011 and got bronchoscopy.   States he has been dealing with Flu recently (1-2 week prior to our visit).   Some reported seasonal Allergies, mostly during the spring.   No GERD history.   Works out [employer] for 1 hour daily.   Relevant Social History  Tobacco: Never smoker  Substances: None reported.   Occupational: Retired/Part time  at [email or web address].   Pets: Dog x2  Hobbies: Woodworking, stains glass.   Family history of pulmonary problems: Sister with chronic lung disease requiring O2 supplementation.     INTERVAL HISTORY:  2024 11/11 - Doing well from the CTD perspective, mentioned his Imuran dose has been decreasing. Brought me his previous PFTs, noted improvement from 2021 to 2023 possibly related to Imuran dose adjustment.  2025 06/02 - SVITLANA use maybe twice, didn't feel much difference.     Past Medical History:    Past Medical History:   Diagnosis Date    Dermatomyositis ([facility])     Hx SBO     Hyperglycemia     ILD (interstitial lung disease) ([facility]) 08/17/2021    SIHLPA (obstructive sleep apnea)      Social History:    Social History     Tobacco Use   Smoking Status Never    Passive exposure: Never   Smokeless Tobacco Never       Family History:  Family History   Problem Relation Age of Onset    Coronary Art Dis Father 58    Heart Disease Father      Current Medications:  Current Outpatient Medications on File Prior to Visit   Medication Sig Dispense Refill    albuterol sulfate HFA

## 2025-06-02 NOTE — PATIENT INSTRUCTIONS
PLEASE ASK YOUR PHARMACY OR INSURANCE ABOUT THE COVERAGE ON THE FOLLOWING INHALERS  ICS/LABA  Breo Ellipta® (Fluticasone, Vilanterol)*  Symbicort® (Budesonide and Formoterol)*  Breyna™ (Budesonide and Formoterol)*  Advair Diskus® (Fluticasone and Salmeterol)*   Advair® HFA (Fluticasone and Salmeterol)*  AirDuo RespiClick® (Fluticasone and Salmeterol)*    Wixela Inhub® (Fluticasone and Salmeterol)*  Dulera® (Mometasone and formoterol)

## 2025-06-02 NOTE — ASSESSMENT & PLAN NOTE
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  No significant signs or symptoms to suggest he has an ongoing infection at the moment.  -Continue treatment under Dr. Kimball (Rheumatology @[facility]) guidance

## 2025-06-02 NOTE — ASSESSMENT & PLAN NOTE
Patient has history of dermatomyositis which is apparently controlled on Imuran.  He follows with rheumatology at [facility]. He reported that he gets CT scans every 2 years that have been relatively stable, also mentioned he does get PFTs every couple years as well however I am not able to see needed the report nor the numbers.  There was deterioration on PFTs since 2021, there was transient improvement noted in 2023 but it is unclear why this happened, I wonder whether this was at that time his Imuran dose was increased.   Discussed with Dr. Kimball (Rheumatology) and his Azathioprine dose was increased, since then his FVC has minimally improved however there is still evidence of obstructive physiology that I suspect might be bronchiolitis related.   -Encouraged to continue physical activity, his fairly active and exercises 3 times a week for an hour with a .  -Repeat PFT yearly, next in May 2026.   -Started high dose ICS/LABA.

## 2025-06-02 NOTE — ASSESSMENT & PLAN NOTE
Reported has been dealing with this for about 6 weeks, unclear initiating factors, he did mention that he had the flu 2 weeks ago which might have prolonged on initial postviral cough, during my interview today his cough was minimal and he did report his overall symptoms have been slowly improving.  -Continue monitoring for now.

## 2025-06-23 ENCOUNTER — HOSPITAL ENCOUNTER (OUTPATIENT)
Dept: PHYSICAL THERAPY | Age: 70
Setting detail: THERAPIES SERIES
Discharge: HOME | End: 2025-06-23

## 2025-06-23 PROCEDURE — 20560 NDL INSJ W/O NJX 1 OR 2 MUSC: CPT

## 2025-06-23 NOTE — FLOWSHEET NOTE
[facility]- Outpatient Rehabilitation and Therapy [address], [place] [address] office: [phone] fax: [phone]     SUBJECTIVE:  Pt reports his back has been feeling pretty good. He continues to feel his pain is managed ok but the dry needling really helps. He did try medical massage with no real positive result.    Objective:  The patient was given Trigger Point Dry Needling education including anatomy, trigger point etiology, risks and side effects, and expected outcomes, as well as questionnaire on pertinent precautions and contraindications.    The pt was educated, questions answered, and consent was signed and scanned into chart. (See media tab)    The below techniques were used to restore functional range of motion, reduce muscle spasm, and induce healing in the corresponding musculature by means of intramuscular mobilization.  Clean Technique was utilized today while applying the Dry Needling treatment. The treatment sites were cleaned with 70% solution of isopropyl alcohol. The PT washed their hands and utilized treatment gloves along with hand  prior to inserting the needles.  All needles were removed and discarded in the appropriate sharps container.     Muscle  Needle Size Technique Notes   Site 1  multifidi  75 mm [x] Pistoning / []  Threading  []  Winding/Coning  Bilat, L3, 4, 5   Site 2  R glute max  75 mm [x] Pistoning / []  Threading  []  Winding/Coning     Site 3   [] Pistoning / []  Threading  []  Winding/Coning    Site 4   [] Pistoning / []  Threading  []  Winding/Coning    Site 5   [] Pistoning / []  Threading  []  Winding/Coning      Attended low frequency (1-20Hz) electrical stimulation was utilized in conjunction with Dry Needling: the Estim was manipulated between all above needles for a period of 8 min.  at 10 volts.  The low frequency electrical stimulation was used to help reduce muscle spasm and help to interrupt /[place] the pain cycle. (97661)

## 2025-06-30 ENCOUNTER — HOSPITAL ENCOUNTER (OUTPATIENT)
Dept: PHYSICAL THERAPY | Age: 70
Setting detail: THERAPIES SERIES
Discharge: HOME | End: 2025-06-30

## 2025-06-30 PROCEDURE — 20560 NDL INSJ W/O NJX 1 OR 2 MUSC: CPT

## 2025-06-30 NOTE — FLOWSHEET NOTE
[facility]- Outpatient Rehabilitation and Therapy [address], [place] [address] office: [phone] fax: [phone]     SUBJECTIVE:  Pt reports his back has been feeling pretty good. He continues to feel his pain is managed ok but the dry needling really helps. He did try medical massage with no real positive result.    Objective:  The patient was given Trigger Point Dry Needling education including anatomy, trigger point etiology, risks and side effects, and expected outcomes, as well as questionnaire on pertinent precautions and contraindications.    The pt was educated, questions answered, and consent was signed and scanned into chart. (See media tab)    The below techniques were used to restore functional range of motion, reduce muscle spasm, and induce healing in the corresponding musculature by means of intramuscular mobilization.  Clean Technique was utilized today while applying the Dry Needling treatment. The treatment sites were cleaned with 70% solution of isopropyl alcohol. The PT washed their hands and utilized treatment gloves along with hand  prior to inserting the needles.  All needles were removed and discarded in the appropriate sharps container.     Muscle  Needle Size Technique Notes   Site 1  multifidi  75 mm [x] Pistoning / []  Threading  []  Winding/Coning  Bilat, L3, 4, 5   Site 2 [x] Pistoning / []  Threading  []  Winding/Coning     Site 3   [] Pistoning / []  Threading  []  Winding/Coning    Site 4   [] Pistoning / []  Threading  []  Winding/Coning    Site 5   [] Pistoning / []  Threading  []  Winding/Coning      Attended low frequency (1-20Hz) electrical stimulation was utilized in conjunction with Dry Needling: the Estim was manipulated between all above needles for a period of 8 min.  at 10 volts.  The low frequency electrical stimulation was used to help reduce muscle spasm and help to interrupt /[place] the pain cycle. (12136)     Ange Hooks 
No. FERNANDA screening performed.  STOP BANG Legend: 0-2 = LOW Risk; 3-4 = INTERMEDIATE Risk; 5-8 = HIGH Risk